# Patient Record
Sex: FEMALE | Race: WHITE | NOT HISPANIC OR LATINO | Employment: UNEMPLOYED | URBAN - NONMETROPOLITAN AREA
[De-identification: names, ages, dates, MRNs, and addresses within clinical notes are randomized per-mention and may not be internally consistent; named-entity substitution may affect disease eponyms.]

---

## 2023-08-23 ENCOUNTER — OFFICE VISIT (OUTPATIENT)
Dept: FAMILY MEDICINE CLINIC | Facility: CLINIC | Age: 58
End: 2023-08-23
Payer: MEDICAID

## 2023-08-23 VITALS
HEART RATE: 100 BPM | HEIGHT: 64 IN | OXYGEN SATURATION: 98 % | SYSTOLIC BLOOD PRESSURE: 120 MMHG | DIASTOLIC BLOOD PRESSURE: 80 MMHG | RESPIRATION RATE: 16 BRPM | BODY MASS INDEX: 20.52 KG/M2 | WEIGHT: 120.2 LBS

## 2023-08-23 DIAGNOSIS — E78.2 HYPERLIPIDEMIA, MIXED: ICD-10-CM

## 2023-08-23 DIAGNOSIS — I10 HYPERTENSION, ESSENTIAL: ICD-10-CM

## 2023-08-23 DIAGNOSIS — D64.9 ANEMIA, UNSPECIFIED TYPE: ICD-10-CM

## 2023-08-23 DIAGNOSIS — L98.9 SKIN LESION: ICD-10-CM

## 2023-08-23 DIAGNOSIS — E11.65 TYPE 2 DIABETES MELLITUS WITH HYPERGLYCEMIA, WITH LONG-TERM CURRENT USE OF INSULIN: Primary | ICD-10-CM

## 2023-08-23 DIAGNOSIS — Z79.4 TYPE 2 DIABETES MELLITUS WITH HYPERGLYCEMIA, WITH LONG-TERM CURRENT USE OF INSULIN: Primary | ICD-10-CM

## 2023-08-23 DIAGNOSIS — G62.9 NEUROPATHY: ICD-10-CM

## 2023-08-23 DIAGNOSIS — Z12.31 ENCOUNTER FOR SCREENING MAMMOGRAM FOR MALIGNANT NEOPLASM OF BREAST: ICD-10-CM

## 2023-08-23 PROCEDURE — 99214 OFFICE O/P EST MOD 30 MIN: CPT | Performed by: FAMILY MEDICINE

## 2023-08-23 PROCEDURE — 3079F DIAST BP 80-89 MM HG: CPT | Performed by: FAMILY MEDICINE

## 2023-08-23 PROCEDURE — 3074F SYST BP LT 130 MM HG: CPT | Performed by: FAMILY MEDICINE

## 2023-08-23 RX ORDER — OMEPRAZOLE 40 MG/1
40 CAPSULE, DELAYED RELEASE ORAL EVERY 12 HOURS SCHEDULED
Qty: 90 CAPSULE | Refills: 1 | Status: SHIPPED | OUTPATIENT
Start: 2023-08-23

## 2023-08-23 NOTE — ASSESSMENT & PLAN NOTE
Patient has not been getting any consistent care.  We are to check blood work to see where we are at.  Come back in 2 weeks for recheck.  If her blood sugars very high I may have her see Kaitlin.

## 2023-08-23 NOTE — PROGRESS NOTES
Patient Name: Jing Bojorquez  : 1965   MRN: [id number]     Chief Complaint:  BG and other medical problems  Chief Complaint   Patient presents with    Follow-up       History of Present Illness: Jing Bojorquez is a 57 y.o. female who is here today for follow up.  HPI        Review of Systems:   Review of Systems   Constitutional: Negative.    HENT: Negative.     Eyes: Negative.    Respiratory: Negative.     Cardiovascular: Negative.    Gastrointestinal: Negative.    Neurological: Negative.       Past Medical History: No past medical history on file.    Past Surgical History: No past surgical history on file.    Family History: No family history on file.    Social History:   Social History     Socioeconomic History    Marital status: Single   Tobacco Use    Smoking status: Never     Passive exposure: Current    Smokeless tobacco: Never   Substance and Sexual Activity    Alcohol use: Never    Drug use: Never    Sexual activity: Yes     Partners: Female       Medications:     Current Outpatient Medications:     amitriptyline (ELAVIL) 25 MG tablet, Take 1 tablet by mouth every night at bedtime., Disp: , Rfl:     atorvastatin (LIPITOR) 40 MG tablet, Take 1 tablet by mouth Daily., Disp: , Rfl:     B-D UF III MINI PEN NEEDLES 31G X 5 MM misc, USE 1 PENNEEDLE FOR INJECTION ONCE DAILY, Disp: , Rfl:     benazepril (LOTENSIN) 20 MG tablet, Take 1 tablet by mouth Daily., Disp: , Rfl:     Blood Glucose Monitoring Suppl (Blood Glucose Monitor System) w/Device kit, Check BG every morning before breakfast/eating  and as needed, Disp: 1 each, Rfl: 0    cetirizine (zyrTEC) 10 MG tablet, Take 1 tablet by mouth Daily., Disp: 30 tablet, Rfl: 11    glipizide (Glucotrol) 10 MG tablet, Take 1 tablet by mouth 2 (Two) Times a Day Before Meals., Disp: 60 tablet, Rfl: 11    hydrOXYzine pamoate (VISTARIL) 25 MG capsule, TAKE 2 CAPSULES BY MOUTH AT BEDTIME FOR INSOMNIA. TAKE 30 - 60 MINUTES BEFORE BED, Disp: , Rfl:     insulin  "glargine (LANTUS, SEMGLEE) 100 UNIT/ML injection, Inject 27.4 Units under the skin into the appropriate area as directed Daily., Disp: 8.22 mL, Rfl: 11    metFORMIN ER (GLUCOPHAGE-XR) 500 MG 24 hr tablet, Take 2 tablets by mouth Every 12 (Twelve) Hours., Disp: , Rfl:     omeprazole (priLOSEC) 40 MG capsule, Take 1 capsule by mouth Every 12 (Twelve) Hours., Disp: 90 capsule, Rfl: 1    Ventolin  (90 Base) MCG/ACT inhaler, , Disp: , Rfl:     Allergies:   Allergies   Allergen Reactions    Cefaclor Anaphylaxis    Penicillins Anaphylaxis    Azithromycin Hives    Morphine Headache    Metformin Other (See Comments)         Physical Exam:  Vital Signs:   Vitals:    08/23/23 1030   BP: 120/80   BP Location: Left arm   Patient Position: Sitting   Cuff Size: Adult   Pulse: 100   Resp: 16   SpO2: 98%   Weight: 54.5 kg (120 lb 3.2 oz)   Height: 162.6 cm (64.02\")     Body mass index is 20.62 kg/mý.     Physical Exam  Vitals and nursing note reviewed.   Constitutional:       Appearance: Normal appearance. She is normal weight.   HENT:      Head: Normocephalic and atraumatic.      Right Ear: Tympanic membrane, ear canal and external ear normal.      Left Ear: Tympanic membrane, ear canal and external ear normal.      Nose: Nose normal.      Mouth/Throat:      Mouth: Mucous membranes are dry.      Pharynx: Oropharynx is clear.   Eyes:      Extraocular Movements: Extraocular movements intact.      Conjunctiva/sclera: Conjunctivae normal.      Pupils: Pupils are equal, round, and reactive to light.   Cardiovascular:      Rate and Rhythm: Normal rate and regular rhythm.      Pulses: Normal pulses.      Heart sounds: Normal heart sounds.   Pulmonary:      Effort: Pulmonary effort is normal.      Breath sounds: Normal breath sounds.   Musculoskeletal:      Cervical back: Normal range of motion and neck supple.   Feet:      Comments:      Neurological:      Mental Status: She is alert.       Procedures      Assessment/Plan: "   Diagnoses and all orders for this visit:    1. Type 2 diabetes mellitus with hyperglycemia, with long-term current use of insulin (Primary)  Assessment & Plan:  Patient has not been getting any consistent care.  We are to check blood work to see where we are at.  Come back in 2 weeks for recheck.  If her blood sugars very high I may have her see Endo.    Orders:  -     Comprehensive Metabolic Panel; Future  -     Hemoglobin A1c; Future  -     Lipid Panel; Future  -     CBC & Differential; Future  -     Vitamin B12; Future  -     TSH Rfx On Abnormal To Free T4; Future  -     Ferritin; Future  -     Folate; Future  -     Iron Profile; Future    2. HTN  Assessment & Plan:  Discussed with patient to monitor their blood pressure and if systolic blood pressure goes above 140 or diastolic is above 90 to return to clinic.  Take medicines as directed, call for any problems, patient not having or any worrisome symptoms.        Orders:  -     Comprehensive Metabolic Panel; Future  -     Hemoglobin A1c; Future  -     Lipid Panel; Future  -     CBC & Differential; Future  -     Vitamin B12; Future  -     TSH Rfx On Abnormal To Free T4; Future  -     Ferritin; Future  -     Folate; Future  -     Iron Profile; Future    3. Hyperlipidemia, mixed  Assessment & Plan:  Blood work    Orders:  -     Comprehensive Metabolic Panel; Future  -     Hemoglobin A1c; Future  -     Lipid Panel; Future  -     CBC & Differential; Future  -     Vitamin B12; Future  -     TSH Rfx On Abnormal To Free T4; Future  -     Ferritin; Future  -     Folate; Future  -     Iron Profile; Future    4. Anemia, unspecified type  Assessment & Plan:  Going to check anemia studies.  She has had an upper and lower scope in the past 2 years.    Orders:  -     Comprehensive Metabolic Panel; Future  -     Hemoglobin A1c; Future  -     Lipid Panel; Future  -     CBC & Differential; Future  -     Vitamin B12; Future  -     TSH Rfx On Abnormal To Free T4; Future  -      Ferritin; Future  -     Folate; Future  -     Iron Profile; Future    5. Neuropathy  Assessment & Plan:  Check vitamin B12 and TSH.    Orders:  -     Comprehensive Metabolic Panel; Future  -     Hemoglobin A1c; Future  -     Lipid Panel; Future  -     CBC & Differential; Future  -     Vitamin B12; Future  -     TSH Rfx On Abnormal To Free T4; Future  -     Ferritin; Future  -     Folate; Future  -     Iron Profile; Future    6. Encounter for screening mammogram for malignant neoplasm of breast  Assessment & Plan:  Mammogram.    Orders:  -     Mammo Screening Bilateral With CAD; Future  -     Comprehensive Metabolic Panel; Future  -     Hemoglobin A1c; Future  -     Lipid Panel; Future  -     CBC & Differential; Future  -     Vitamin B12; Future  -     TSH Rfx On Abnormal To Free T4; Future  -     Ferritin; Future  -     Folate; Future  -     Iron Profile; Future    7. Skin lesion  Assessment & Plan:  She has a lesion in her right ear.  I Beatriz send her to dermatology.    Orders:  -     Comprehensive Metabolic Panel; Future  -     Hemoglobin A1c; Future  -     Lipid Panel; Future  -     CBC & Differential; Future  -     Vitamin B12; Future  -     TSH Rfx On Abnormal To Free T4; Future  -     Ferritin; Future  -     Folate; Future  -     Iron Profile; Future  -     Ambulatory Referral to Dermatology    Other orders  -     omeprazole (priLOSEC) 40 MG capsule; Take 1 capsule by mouth Every 12 (Twelve) Hours.  Dispense: 90 capsule; Refill: 1  -     Pneumococcal Conjugate Vaccine 20-Valent (PCV20)             Follow Up:   Return in about 2 weeks (around 9/6/2023) for Annual physical.    Antoni Lam MD  [facility]

## 2023-09-06 ENCOUNTER — LAB (OUTPATIENT)
Dept: FAMILY MEDICINE CLINIC | Facility: CLINIC | Age: 58
End: 2023-09-06
Payer: MEDICAID

## 2023-09-06 DIAGNOSIS — G62.9 NEUROPATHY: ICD-10-CM

## 2023-09-06 DIAGNOSIS — E78.2 HYPERLIPIDEMIA, MIXED: ICD-10-CM

## 2023-09-06 DIAGNOSIS — Z12.31 ENCOUNTER FOR SCREENING MAMMOGRAM FOR MALIGNANT NEOPLASM OF BREAST: ICD-10-CM

## 2023-09-06 DIAGNOSIS — L98.9 SKIN LESION: ICD-10-CM

## 2023-09-06 DIAGNOSIS — E11.65 TYPE 2 DIABETES MELLITUS WITH HYPERGLYCEMIA, WITH LONG-TERM CURRENT USE OF INSULIN: ICD-10-CM

## 2023-09-06 DIAGNOSIS — D64.9 ANEMIA, UNSPECIFIED TYPE: ICD-10-CM

## 2023-09-06 DIAGNOSIS — Z79.4 TYPE 2 DIABETES MELLITUS WITH HYPERGLYCEMIA, WITH LONG-TERM CURRENT USE OF INSULIN: ICD-10-CM

## 2023-09-06 DIAGNOSIS — I10 HYPERTENSION, ESSENTIAL: ICD-10-CM

## 2023-09-06 PROCEDURE — 36415 COLL VENOUS BLD VENIPUNCTURE: CPT | Performed by: FAMILY MEDICINE

## 2023-09-07 LAB
ALBUMIN SERPL-MCNC: 4.2 G/DL (ref 3.8–4.9)
ALBUMIN/GLOB SERPL: 1.4 {RATIO} (ref 1.2–2.2)
ALP SERPL-CCNC: 117 IU/L (ref 44–121)
ALT SERPL-CCNC: 284 IU/L (ref 0–32)
AST SERPL-CCNC: 221 IU/L (ref 0–40)
BASOPHILS # BLD AUTO: 0 X10E3/UL (ref 0–0.2)
BASOPHILS NFR BLD AUTO: 0 %
BILIRUB SERPL-MCNC: 0.4 MG/DL (ref 0–1.2)
BUN SERPL-MCNC: 17 MG/DL (ref 6–24)
BUN/CREAT SERPL: 30 (ref 9–23)
CALCIUM SERPL-MCNC: 9.3 MG/DL (ref 8.7–10.2)
CHLORIDE SERPL-SCNC: 99 MMOL/L (ref 96–106)
CHOLEST SERPL-MCNC: 94 MG/DL (ref 100–199)
CO2 SERPL-SCNC: 19 MMOL/L (ref 20–29)
CREAT SERPL-MCNC: 0.57 MG/DL (ref 0.57–1)
EGFRCR SERPLBLD CKD-EPI 2021: 107 ML/MIN/1.73
EOSINOPHIL # BLD AUTO: 0.1 X10E3/UL (ref 0–0.4)
EOSINOPHIL NFR BLD AUTO: 2 %
ERYTHROCYTE [DISTWIDTH] IN BLOOD BY AUTOMATED COUNT: 12.7 % (ref 11.7–15.4)
FOLATE SERPL-MCNC: >20 NG/ML
GLOBULIN SER CALC-MCNC: 2.9 G/DL (ref 1.5–4.5)
GLUCOSE SERPL-MCNC: 277 MG/DL (ref 70–99)
HBA1C MFR BLD: 10.7 % (ref 4.8–5.6)
HCT VFR BLD AUTO: 34.5 % (ref 34–46.6)
HDLC SERPL-MCNC: 46 MG/DL
HGB BLD-MCNC: 10.8 G/DL (ref 11.1–15.9)
IMM GRANULOCYTES # BLD AUTO: 0 X10E3/UL (ref 0–0.1)
IMM GRANULOCYTES NFR BLD AUTO: 0 %
LDLC SERPL CALC-MCNC: 22 MG/DL (ref 0–99)
LYMPHOCYTES # BLD AUTO: 1.8 X10E3/UL (ref 0.7–3.1)
LYMPHOCYTES NFR BLD AUTO: 26 %
MCH RBC QN AUTO: 26.5 PG (ref 26.6–33)
MCHC RBC AUTO-ENTMCNC: 31.3 G/DL (ref 31.5–35.7)
MCV RBC AUTO: 85 FL (ref 79–97)
MONOCYTES # BLD AUTO: 0.7 X10E3/UL (ref 0.1–0.9)
MONOCYTES NFR BLD AUTO: 11 %
NEUTROPHILS # BLD AUTO: 4 X10E3/UL (ref 1.4–7)
NEUTROPHILS NFR BLD AUTO: 61 %
PLATELET # BLD AUTO: 232 X10E3/UL (ref 150–450)
POTASSIUM SERPL-SCNC: 4.1 MMOL/L (ref 3.5–5.2)
PROT SERPL-MCNC: 7.1 G/DL (ref 6–8.5)
RBC # BLD AUTO: 4.08 X10E6/UL (ref 3.77–5.28)
SODIUM SERPL-SCNC: 137 MMOL/L (ref 134–144)
TRIGL SERPL-MCNC: 158 MG/DL (ref 0–149)
TSH SERPL DL<=0.005 MIU/L-ACNC: 1.46 UIU/ML (ref 0.45–4.5)
VIT B12 SERPL-MCNC: 1130 PG/ML (ref 232–1245)
VLDLC SERPL CALC-MCNC: 26 MG/DL (ref 5–40)
WBC # BLD AUTO: 6.7 X10E3/UL (ref 3.4–10.8)

## 2023-09-11 ENCOUNTER — OFFICE VISIT (OUTPATIENT)
Dept: FAMILY MEDICINE CLINIC | Facility: CLINIC | Age: 58
End: 2023-09-11
Payer: MEDICAID

## 2023-09-11 VITALS
HEIGHT: 64 IN | SYSTOLIC BLOOD PRESSURE: 128 MMHG | BODY MASS INDEX: 21.29 KG/M2 | HEART RATE: 77 BPM | OXYGEN SATURATION: 98 % | DIASTOLIC BLOOD PRESSURE: 84 MMHG | RESPIRATION RATE: 16 BRPM | WEIGHT: 124.7 LBS

## 2023-09-11 DIAGNOSIS — Z79.4 TYPE 2 DIABETES MELLITUS WITH HYPERGLYCEMIA, WITH LONG-TERM CURRENT USE OF INSULIN: Primary | ICD-10-CM

## 2023-09-11 DIAGNOSIS — Z59.819 HOUSING INSECURITY: ICD-10-CM

## 2023-09-11 DIAGNOSIS — E11.40 TYPE 2 DIABETES MELLITUS WITH DIABETIC NEUROPATHY, WITH LONG-TERM CURRENT USE OF INSULIN: ICD-10-CM

## 2023-09-11 DIAGNOSIS — E78.2 HYPERLIPIDEMIA, MIXED: ICD-10-CM

## 2023-09-11 DIAGNOSIS — I10 HYPERTENSION, ESSENTIAL: ICD-10-CM

## 2023-09-11 DIAGNOSIS — E11.65 TYPE 2 DIABETES MELLITUS WITH HYPERGLYCEMIA, WITH LONG-TERM CURRENT USE OF INSULIN: Primary | ICD-10-CM

## 2023-09-11 DIAGNOSIS — G62.9 NEUROPATHY: ICD-10-CM

## 2023-09-11 DIAGNOSIS — R79.89 ELEVATED LIVER FUNCTION TESTS: ICD-10-CM

## 2023-09-11 DIAGNOSIS — D64.9 ANEMIA, UNSPECIFIED TYPE: ICD-10-CM

## 2023-09-11 DIAGNOSIS — Z79.4 TYPE 2 DIABETES MELLITUS WITH DIABETIC NEUROPATHY, WITH LONG-TERM CURRENT USE OF INSULIN: ICD-10-CM

## 2023-09-11 PROBLEM — K57.30 DIVERTICULOSIS OF COLON: Status: ACTIVE | Noted: 2023-09-11

## 2023-09-11 PROBLEM — R63.4 ABNORMAL WEIGHT LOSS: Status: ACTIVE | Noted: 2023-09-11

## 2023-09-11 PROBLEM — M54.30 SCIATICA: Status: ACTIVE | Noted: 2023-06-26

## 2023-09-11 PROBLEM — Z90.49 HX OF CHOLECYSTECTOMY: Status: ACTIVE | Noted: 2018-06-19

## 2023-09-11 PROBLEM — R19.7 DIARRHEA: Status: ACTIVE | Noted: 2017-09-06

## 2023-09-11 PROBLEM — K52.9 ENTERITIS: Status: ACTIVE | Noted: 2020-09-30

## 2023-09-11 PROBLEM — N12 PYELONEPHRITIS: Status: ACTIVE | Noted: 2021-08-25

## 2023-09-11 PROBLEM — K59.00 CONSTIPATION: Status: ACTIVE | Noted: 2023-08-30

## 2023-09-11 PROBLEM — E11.10 METABOLIC ACIDOSIS DUE TO DIABETES MELLITUS: Status: ACTIVE | Noted: 2019-04-08

## 2023-09-11 PROBLEM — K21.9 ACID REFLUX: Status: ACTIVE | Noted: 2018-06-19

## 2023-09-11 PROBLEM — E87.1 HYPONATREMIA: Status: ACTIVE | Noted: 2019-04-08

## 2023-09-11 PROBLEM — E11.9 DIABETES: Status: ACTIVE | Noted: 2018-06-19

## 2023-09-11 PROBLEM — S20.219A CONTUSION OF RIB: Status: ACTIVE | Noted: 2018-04-08

## 2023-09-11 PROBLEM — M79.2 NEUROPATHIC PAIN OF LEFT LOWER EXTREMITY: Status: ACTIVE | Noted: 2023-06-26

## 2023-09-11 PROBLEM — S20.219A CHEST WALL CONTUSION: Status: ACTIVE | Noted: 2018-06-27

## 2023-09-11 PROBLEM — T78.40XA ALLERGIC REACTION CAUSED BY A DRUG: Status: ACTIVE | Noted: 2021-08-25

## 2023-09-11 PROBLEM — R07.9 CHEST PAIN: Status: ACTIVE | Noted: 2022-10-19

## 2023-09-11 PROBLEM — R73.9 HYPERGLYCEMIA: Status: ACTIVE | Noted: 2019-04-08

## 2023-09-11 PROBLEM — Z86.19 HISTORY OF HELICOBACTER PYLORI INFECTION: Status: ACTIVE | Noted: 2023-09-11

## 2023-09-11 PROBLEM — J02.0 STREP PHARYNGITIS: Status: ACTIVE | Noted: 2018-09-26

## 2023-09-11 PROBLEM — K22.70 SHORT-SEGMENT BARRETT'S ESOPHAGUS: Status: ACTIVE | Noted: 2023-09-11

## 2023-09-11 PROBLEM — R74.01 TRANSAMINITIS: Status: ACTIVE | Noted: 2019-04-08

## 2023-09-11 PROBLEM — R21 RASH: Status: ACTIVE | Noted: 2021-08-25

## 2023-09-11 PROBLEM — Z90.49 HX OF APPENDECTOMY: Status: ACTIVE | Noted: 2018-06-19

## 2023-09-11 PROBLEM — N17.9 ACUTE KIDNEY FAILURE: Status: ACTIVE | Noted: 2019-04-08

## 2023-09-11 PROCEDURE — 3079F DIAST BP 80-89 MM HG: CPT | Performed by: FAMILY MEDICINE

## 2023-09-11 PROCEDURE — 3074F SYST BP LT 130 MM HG: CPT | Performed by: FAMILY MEDICINE

## 2023-09-11 PROCEDURE — 3046F HEMOGLOBIN A1C LEVEL >9.0%: CPT | Performed by: FAMILY MEDICINE

## 2023-09-11 RX ORDER — INSULIN DETEMIR 100 [IU]/ML
INJECTION, SOLUTION SUBCUTANEOUS
COMMUNITY

## 2023-09-11 RX ORDER — INSULIN GLARGINE 100 [IU]/ML
0.5 INJECTION, SOLUTION SUBCUTANEOUS DAILY
Qty: 8.22 ML | Refills: 11 | Status: SHIPPED | OUTPATIENT
Start: 2023-09-11 | End: 2024-09-10

## 2023-09-11 SDOH — ECONOMIC STABILITY - HOUSING INSECURITY: HOUSING INSTABILITY UNSPECIFIED: Z59.819

## 2023-09-11 NOTE — PROGRESS NOTES
Patient Name: Jing Bojorquez  : 1965   MRN: [id number]     Chief Complaint:    Chief Complaint   Patient presents with    Follow-up       History of Present Illness: Jing Bojorquez is a 58 y.o. female who is here today for follow up.  HPI        Review of Systems:   Review of Systems     Past Medical History: No past medical history on file.    Past Surgical History: No past surgical history on file.    Family History: No family history on file.    Social History:   Social History     Socioeconomic History    Marital status: Single   Tobacco Use    Smoking status: Never     Passive exposure: Current    Smokeless tobacco: Never   Substance and Sexual Activity    Alcohol use: Never    Drug use: Never    Sexual activity: Yes     Partners: Female       Medications:     Current Outpatient Medications:     amitriptyline (ELAVIL) 25 MG tablet, Take 1 tablet by mouth every night at bedtime., Disp: , Rfl:     atorvastatin (LIPITOR) 40 MG tablet, Take 1 tablet by mouth Daily., Disp: , Rfl:     B-D UF III MINI PEN NEEDLES 31G X 5 MM misc, USE 1 PENNEEDLE FOR INJECTION ONCE DAILY, Disp: , Rfl:     benazepril (LOTENSIN) 20 MG tablet, Take 1 tablet by mouth Daily., Disp: , Rfl:     Blood Glucose Monitoring Suppl (Blood Glucose Monitor System) w/Device kit, Check BG every morning before breakfast/eating  and as needed, Disp: 1 each, Rfl: 0    cetirizine (zyrTEC) 10 MG tablet, Take 1 tablet by mouth Daily., Disp: 30 tablet, Rfl: 11    glipizide (Glucotrol) 10 MG tablet, Take 1 tablet by mouth 2 (Two) Times a Day Before Meals., Disp: 60 tablet, Rfl: 11    hydrOXYzine pamoate (VISTARIL) 25 MG capsule, TAKE 2 CAPSULES BY MOUTH AT BEDTIME FOR INSOMNIA. TAKE 30 - 60 MINUTES BEFORE BED, Disp: , Rfl:     insulin glargine (LANTUS, SEMGLEE) 100 UNIT/ML injection, Inject 27.4 Units under the skin into the appropriate area as directed Daily., Disp: 8.22 mL, Rfl: 11    metFORMIN ER (GLUCOPHAGE-XR) 500 MG 24 hr tablet, Take 2 tablets  "by mouth Every 12 (Twelve) Hours., Disp: , Rfl:     omeprazole (priLOSEC) 40 MG capsule, Take 1 capsule by mouth Every 12 (Twelve) Hours., Disp: 90 capsule, Rfl: 1    Ventolin  (90 Base) MCG/ACT inhaler, , Disp: , Rfl:     insulin detemir (Levemir FlexPen) 100 UNIT/ML injection, Inject  under the skin into the appropriate area as directed., Disp: , Rfl:     OMEPRAZOLE 2MG/ML LIQUID, Take  by mouth., Disp: , Rfl:     Allergies:   Allergies   Allergen Reactions    Cefaclor Anaphylaxis    Penicillins Anaphylaxis    Azithromycin Hives    Morphine Headache    Metformin Other (See Comments)         Physical Exam:  Vital Signs:   Vitals:    09/11/23 1040   BP: 128/84   BP Location: Left arm   Patient Position: Sitting   Cuff Size: Adult   Pulse: 77   Resp: 16   SpO2: 98%   Weight: 56.6 kg (124 lb 11.2 oz)   Height: 162.4 cm (63.94\")   PainSc: 10-Worst pain ever   PainLoc: Leg     Body mass index is 21.45 kg/m².     Physical Exam    Procedures      Assessment/Plan:   Diagnoses and all orders for this visit:    1. Type 2 diabetes mellitus with hyperglycemia, with long-term current use of insulin (Primary)  Assessment & Plan:  A1c is 10.7.  We will refer to endocrinology.      2. Hyperlipidemia, mixed  Assessment & Plan:  HDL 46.  LDL 22.  Triglycerides 158.  We will follow.      3. Elevated liver function tests  Assessment & Plan:  ALT is 84.  AST is 221.  Patient does not drink alcohol.  We will get a liver ultrasound and check hepatitis panel.    Orders:  -     Hepatitis C Antibody; Future  -     Hepatitis B Surface Antibody; Future  -     Hepatitis B Surface Antigen; Future  -     US Liver; Future    4. Hypertension, essential  Assessment & Plan:  Discussed with patient to monitor their blood pressure and if systolic blood pressure goes above 140 or diastolic is above 90 to return to clinic.  Take medicines as directed, call for any problems, patient not having or any worrisome symptoms.          5. Anemia, " unspecified type  Assessment & Plan:  Blood work.  Patient has seen GI.    Orders:  -     CBC & Differential; Future  -     Iron Profile; Future  -     Ferritin; Future  -     Haptoglobin; Future    6. Housing insecurity  Assessment & Plan:  Signed form for patient to get [employer] housing.      7. Neuropathy             Follow Up:   Return in about 1 month (around 10/11/2023) for Annual physical.    Antoni Lam MD  [facility]

## 2023-09-11 NOTE — ASSESSMENT & PLAN NOTE
ALT is 84.  AST is 221.  Patient does not drink alcohol.  We will get a liver ultrasound and check hepatitis panel.

## 2023-09-12 LAB
BASOPHILS # BLD AUTO: 0 X10E3/UL (ref 0–0.2)
BASOPHILS NFR BLD AUTO: 0 %
EOSINOPHIL # BLD AUTO: 0.1 X10E3/UL (ref 0–0.4)
EOSINOPHIL NFR BLD AUTO: 2 %
ERYTHROCYTE [DISTWIDTH] IN BLOOD BY AUTOMATED COUNT: 12.9 % (ref 11.7–15.4)
FERRITIN SERPL-MCNC: 51 NG/ML (ref 15–150)
HAPTOGLOB SERPL-MCNC: 149 MG/DL (ref 33–346)
HBV SURFACE AB SER QL: REACTIVE
HBV SURFACE AG SERPL QL IA: NEGATIVE
HCT VFR BLD AUTO: 30.7 % (ref 34–46.6)
HCV IGG SERPL QL IA: NON REACTIVE
HGB BLD-MCNC: 9.9 G/DL (ref 11.1–15.9)
IMM GRANULOCYTES # BLD AUTO: 0 X10E3/UL (ref 0–0.1)
IMM GRANULOCYTES NFR BLD AUTO: 0 %
IRON SATN MFR SERPL: 12 % (ref 15–55)
IRON SERPL-MCNC: 33 UG/DL (ref 27–159)
LYMPHOCYTES # BLD AUTO: 1.6 X10E3/UL (ref 0.7–3.1)
LYMPHOCYTES NFR BLD AUTO: 22 %
MCH RBC QN AUTO: 26.6 PG (ref 26.6–33)
MCHC RBC AUTO-ENTMCNC: 32.2 G/DL (ref 31.5–35.7)
MCV RBC AUTO: 83 FL (ref 79–97)
MONOCYTES # BLD AUTO: 0.7 X10E3/UL (ref 0.1–0.9)
MONOCYTES NFR BLD AUTO: 10 %
NEUTROPHILS # BLD AUTO: 4.9 X10E3/UL (ref 1.4–7)
NEUTROPHILS NFR BLD AUTO: 66 %
PLATELET # BLD AUTO: 216 X10E3/UL (ref 150–450)
RBC # BLD AUTO: 3.72 X10E6/UL (ref 3.77–5.28)
TIBC SERPL-MCNC: 274 UG/DL (ref 250–450)
UIBC SERPL-MCNC: 241 UG/DL (ref 131–425)
WBC # BLD AUTO: 7.3 X10E3/UL (ref 3.4–10.8)

## 2023-10-11 ENCOUNTER — TELEPHONE (OUTPATIENT)
Dept: FAMILY MEDICINE CLINIC | Facility: CLINIC | Age: 58
End: 2023-10-11

## 2023-10-11 DIAGNOSIS — G62.9 NEUROPATHY: Primary | ICD-10-CM

## 2023-10-11 DIAGNOSIS — E11.65 TYPE 2 DIABETES MELLITUS WITH HYPERGLYCEMIA, UNSPECIFIED WHETHER LONG TERM INSULIN USE: ICD-10-CM

## 2023-10-11 NOTE — TELEPHONE ENCOUNTER
Caller: Jing Bojorquez    Relationship: Self    Best call back number: [phone]     What are your current symptoms:   NEUROPATHY IN LEGS     Have you had these symptoms before:    [x] Yes  [] No    Have you been treated for these symptoms before:   [x] Yes  [] No    If a prescription is needed, what is your preferred pharmacy and phone number:    [facility] [address] [place] [address] [phone] [facility] [phone]  [phone]     Additional notes:  PATIENT STATED THAT SHE HAD A SCHEDULED APPOINTMENT TODAY 10/11/2023 AND GOT THE TIME MESSED UP FOR THE APPOINTMENT AND IS WAS COMING IN TO BE SEEN TO GET HELP WITH THE PAIN SHE IS HAVING IN HER LEGS FROM NEUROPATHY AND WOULD LIKE FOR MEDICATION TO BE CALLED INTO THE PHARMACY TO HELP SINCE SHE WAS UNABLE TO BE SEEN IN THE OFFICE TODAY 10/11/2023

## 2023-10-12 ENCOUNTER — TELEPHONE (OUTPATIENT)
Dept: FAMILY MEDICINE CLINIC | Facility: CLINIC | Age: 58
End: 2023-10-12
Payer: MEDICAID

## 2023-10-12 NOTE — TELEPHONE ENCOUNTER
Sent in referral for endocrinology with her elevated liver function tests I do not want to give her anything for pain at this time.

## 2023-10-12 NOTE — TELEPHONE ENCOUNTER
Patient called back in. I spoke to about how she was feeling today. She is complaining of her legs still being swollen and painful. I told her Dr. Lam would put a referral in for pain management and discussed her being terminated from seeing our office. She understood, and requested that he also check on her endocrinologist referral. I looked into that for her and it looks like it was closed where she did not answer or call back to schedule. Patient was understanding of everything, I told her to make sure she tries to answer phone numbers from [employer] or at least call those back because it could be someone about her referrals.

## 2023-10-12 NOTE — TELEPHONE ENCOUNTER
PATIENT CALLED BACK. RELAYED INFORMATION TO PATIENT. CALLED OFFICE TO [facility]. NO ANSWER TWICE.  PLEASE CALL PATIENT BACK AND SCHEDULE AN APPOINTMENT FOR HER.  THANK YOU.

## 2023-10-12 NOTE — TELEPHONE ENCOUNTER
I have a call into the patient. Please do a referral to pain management. Once I speak to patient I will see if she needs any of her medications refilled.

## 2023-10-12 NOTE — TELEPHONE ENCOUNTER
Can you create a referral for endocrinology?    Also wanting something for leg pain if possible.    Said she was good on all her other medications.

## 2023-10-25 ENCOUNTER — TELEPHONE (OUTPATIENT)
Dept: FAMILY MEDICINE CLINIC | Facility: CLINIC | Age: 58
End: 2023-10-25
Payer: MEDICAID

## 2023-10-25 DIAGNOSIS — K21.9 GASTROESOPHAGEAL REFLUX DISEASE WITHOUT ESOPHAGITIS: ICD-10-CM

## 2023-10-25 DIAGNOSIS — E11.65 TYPE 2 DIABETES MELLITUS WITH HYPERGLYCEMIA, WITH LONG-TERM CURRENT USE OF INSULIN: Primary | ICD-10-CM

## 2023-10-25 DIAGNOSIS — Z79.4 TYPE 2 DIABETES MELLITUS WITH HYPERGLYCEMIA, WITH LONG-TERM CURRENT USE OF INSULIN: Primary | ICD-10-CM

## 2023-10-25 RX ORDER — OMEPRAZOLE 40 MG/1
40 CAPSULE, DELAYED RELEASE ORAL EVERY 12 HOURS SCHEDULED
Qty: 90 CAPSULE | Refills: 1 | Status: SHIPPED | OUTPATIENT
Start: 2023-10-25

## 2023-10-25 RX ORDER — METFORMIN HYDROCHLORIDE 500 MG/1
1000 TABLET, EXTENDED RELEASE ORAL EVERY 12 HOURS SCHEDULED
Qty: 180 TABLET | Refills: 0 | Status: SHIPPED | OUTPATIENT
Start: 2023-10-25

## 2023-10-25 NOTE — TELEPHONE ENCOUNTER
Caller: Jing Bojorquez    Relationship: Self    Best call back number:       [phone] (Mobile)     Requested Prescriptions:       metFORMIN ER (GLUCOPHAGE-XR) 500 MG 24 hr tablet       omeprazole (priLOSEC) 40 MG capsule     Pharm[facility] where request should be sent:     [place] [address] [phone] [phone]      Last office visit with prescribing clinician: 9/11/2023   Last telemedicine visit with prescribing clinician: Visit date not found   Next office visit with prescribing clinician: Visit date not found     Additional details provided by patient:     PATIENT STATED SHE IS OUT OF THE METFORMIN AND WILL BE OUT OF THE OMEPRAZOLE IN (2) DAYS    PATIENT ALSO STATED SHE IS OUT OF REFILLS FOR THE OMEPRAZOLE    Does the patient have less than a 3 day supply:  [x] Yes  [] No    Would you like a call back once the refill request has been completed: [] Yes [] No    If the office needs to give you a call back, can they leave a voicemail: [] Yes [] No    Radha Whaley Rep   10/25/23 11:52 EDT     DR DAY

## 2023-12-08 DIAGNOSIS — Z79.4 TYPE 2 DIABETES MELLITUS WITH HYPERGLYCEMIA, WITH LONG-TERM CURRENT USE OF INSULIN: ICD-10-CM

## 2023-12-08 DIAGNOSIS — E11.65 TYPE 2 DIABETES MELLITUS WITH HYPERGLYCEMIA, WITH LONG-TERM CURRENT USE OF INSULIN: ICD-10-CM

## 2023-12-08 NOTE — TELEPHONE ENCOUNTER
Caller: Jing Bojorquez    Relationship: Self    Best call back number: [phone]     Requested Prescriptions:   Requested Prescriptions     Pending Prescriptions Disp Refills    atorvastatin (LIPITOR) 40 MG tablet 90 tablet      Sig: Take 1 tablet by mouth Daily.    metFORMIN ER (GLUCOPHAGE-XR) 500 MG 24 hr tablet 180 tablet 0     Sig: Take 2 tablets by mouth Every 12 (Twelve) Hours.    benazepril (LOTENSIN) 20 MG tablet       Sig: Take 1 tablet by mouth Daily.        Pharmacy where request should be sent: [address] [phone] [facility] [phone] [facility]     Last office visit with prescribing clinician: 9/11/2023   Last telemedicine visit with prescribing clinician: Visit date not found   Next office visit with prescribing clinician: Visit date not found     Additional details provided by patient: PATIENT SAID SHE IS VERY LOW OR COMPLETELY OUT OF THESE MEDICATIONS        Does the patient have less than a 3 day supply:  [x] Yes  [] No    Would you like a call back once the refill request has been completed: [] Yes [x] No    If the office needs to give you a call back, can they leave a voicemail: [] Yes [x] No    Radha Shelley Rep   12/08/23 10:15 EST

## 2023-12-08 NOTE — TELEPHONE ENCOUNTER
Rx Refill Note    Requested Prescriptions     Pending Prescriptions Disp Refills    atorvastatin (LIPITOR) 40 MG tablet 90 tablet 0     Sig: Take 1 tablet by mouth Daily.    metFORMIN ER (GLUCOPHAGE-XR) 500 MG 24 hr tablet 180 tablet 0     Sig: Take 2 tablets by mouth Every 12 (Twelve) Hours.    benazepril (LOTENSIN) 20 MG tablet 90 tablet 0     Sig: Take 1 tablet by mouth Daily.        Last office visit with prescribing clinician: 9/11/2023      Next office visit with prescribing clinician: Visit date not found   Last labs:   Last refill: needs   Pharmacy (be sure to add in Epic). Correct

## 2023-12-10 RX ORDER — BENAZEPRIL HYDROCHLORIDE 20 MG/1
20 TABLET ORAL DAILY
Qty: 90 TABLET | Refills: 0 | Status: SHIPPED | OUTPATIENT
Start: 2023-12-10

## 2023-12-10 RX ORDER — ATORVASTATIN CALCIUM 40 MG/1
40 TABLET, FILM COATED ORAL DAILY
Qty: 90 TABLET | Refills: 0 | Status: SHIPPED | OUTPATIENT
Start: 2023-12-10

## 2023-12-10 RX ORDER — METFORMIN HYDROCHLORIDE 500 MG/1
1000 TABLET, EXTENDED RELEASE ORAL EVERY 12 HOURS SCHEDULED
Qty: 180 TABLET | Refills: 0 | Status: SHIPPED | OUTPATIENT
Start: 2023-12-10

## 2023-12-20 ENCOUNTER — OFFICE VISIT (OUTPATIENT)
Dept: FAMILY MEDICINE CLINIC | Facility: CLINIC | Age: 58
End: 2023-12-20
Payer: MEDICAID

## 2023-12-20 VITALS
BODY MASS INDEX: 22.53 KG/M2 | DIASTOLIC BLOOD PRESSURE: 70 MMHG | OXYGEN SATURATION: 98 % | HEART RATE: 95 BPM | WEIGHT: 131 LBS | SYSTOLIC BLOOD PRESSURE: 110 MMHG

## 2023-12-20 DIAGNOSIS — G62.9 NEUROPATHY: ICD-10-CM

## 2023-12-20 DIAGNOSIS — K21.9 GERD WITHOUT ESOPHAGITIS: ICD-10-CM

## 2023-12-20 DIAGNOSIS — I10 HYPERTENSION, ESSENTIAL: ICD-10-CM

## 2023-12-20 DIAGNOSIS — R79.89 ELEVATED LIVER FUNCTION TESTS: ICD-10-CM

## 2023-12-20 DIAGNOSIS — E11.42 TYPE 2 DIABETES MELLITUS WITH DIABETIC POLYNEUROPATHY, WITHOUT LONG-TERM CURRENT USE OF INSULIN: ICD-10-CM

## 2023-12-20 DIAGNOSIS — J30.1 SEASONAL ALLERGIC RHINITIS DUE TO POLLEN: ICD-10-CM

## 2023-12-20 DIAGNOSIS — E78.2 HYPERLIPIDEMIA, MIXED: ICD-10-CM

## 2023-12-20 DIAGNOSIS — E11.65 TYPE 2 DIABETES MELLITUS WITH HYPERGLYCEMIA, WITHOUT LONG-TERM CURRENT USE OF INSULIN: Primary | ICD-10-CM

## 2023-12-20 DIAGNOSIS — Z23 NEED FOR PROPHYLACTIC VACCINATION AGAINST STREPTOCOCCUS PNEUMONIAE (PNEUMOCOCCUS): ICD-10-CM

## 2023-12-20 DIAGNOSIS — R80.9 TYPE 2 DIABETES MELLITUS WITH MICROALBUMINURIA, WITHOUT LONG-TERM CURRENT USE OF INSULIN: ICD-10-CM

## 2023-12-20 DIAGNOSIS — E11.29 TYPE 2 DIABETES MELLITUS WITH MICROALBUMINURIA, WITHOUT LONG-TERM CURRENT USE OF INSULIN: ICD-10-CM

## 2023-12-20 PROBLEM — R63.4 ABNORMAL WEIGHT LOSS: Status: RESOLVED | Noted: 2023-09-11 | Resolved: 2023-12-20

## 2023-12-20 PROBLEM — S20.219A CONTUSION OF RIB: Status: RESOLVED | Noted: 2018-04-08 | Resolved: 2023-12-20

## 2023-12-20 PROBLEM — N17.9 ACUTE KIDNEY FAILURE: Status: RESOLVED | Noted: 2019-04-08 | Resolved: 2023-12-20

## 2023-12-20 PROBLEM — N12 PYELONEPHRITIS: Status: RESOLVED | Noted: 2021-08-25 | Resolved: 2023-12-20

## 2023-12-20 PROBLEM — E11.40 TYPE 2 DIABETES MELLITUS WITH DIABETIC NEUROPATHY, WITH LONG-TERM CURRENT USE OF INSULIN: Status: ACTIVE | Noted: 2023-12-20

## 2023-12-20 PROBLEM — T78.40XA ALLERGIC REACTION CAUSED BY A DRUG: Status: RESOLVED | Noted: 2021-08-25 | Resolved: 2023-12-20

## 2023-12-20 PROBLEM — K59.00 CONSTIPATION: Status: RESOLVED | Noted: 2023-08-30 | Resolved: 2023-12-20

## 2023-12-20 PROBLEM — D64.9 ANEMIA: Status: RESOLVED | Noted: 2023-08-23 | Resolved: 2023-12-20

## 2023-12-20 PROBLEM — E87.1 HYPONATREMIA: Status: RESOLVED | Noted: 2019-04-08 | Resolved: 2023-12-20

## 2023-12-20 PROBLEM — Z12.31 ENCOUNTER FOR SCREENING MAMMOGRAM FOR MALIGNANT NEOPLASM OF BREAST: Status: RESOLVED | Noted: 2023-08-23 | Resolved: 2023-12-20

## 2023-12-20 PROBLEM — Z79.4 TYPE 2 DIABETES MELLITUS WITH HYPERGLYCEMIA, WITH LONG-TERM CURRENT USE OF INSULIN: Chronic | Status: ACTIVE | Noted: 2023-08-23

## 2023-12-20 PROBLEM — E11.10 METABOLIC ACIDOSIS DUE TO DIABETES MELLITUS: Status: RESOLVED | Noted: 2019-04-08 | Resolved: 2023-12-20

## 2023-12-20 PROBLEM — Z90.49 HX OF CHOLECYSTECTOMY: Status: RESOLVED | Noted: 2018-06-19 | Resolved: 2023-12-20

## 2023-12-20 PROBLEM — R19.7 DIARRHEA: Status: RESOLVED | Noted: 2017-09-06 | Resolved: 2023-12-20

## 2023-12-20 PROBLEM — R21 RASH: Status: RESOLVED | Noted: 2021-08-25 | Resolved: 2023-12-20

## 2023-12-20 PROBLEM — R74.01 TRANSAMINITIS: Status: RESOLVED | Noted: 2019-04-08 | Resolved: 2023-12-20

## 2023-12-20 PROBLEM — R73.9 HYPERGLYCEMIA: Status: RESOLVED | Noted: 2019-04-08 | Resolved: 2023-12-20

## 2023-12-20 PROBLEM — Z79.4 TYPE 2 DIABETES MELLITUS WITH DIABETIC NEUROPATHY, WITH LONG-TERM CURRENT USE OF INSULIN: Chronic | Status: ACTIVE | Noted: 2023-12-20

## 2023-12-20 PROBLEM — Z86.19 HISTORY OF HELICOBACTER PYLORI INFECTION: Status: RESOLVED | Noted: 2023-09-11 | Resolved: 2023-12-20

## 2023-12-20 PROBLEM — K52.9 ENTERITIS: Status: RESOLVED | Noted: 2020-09-30 | Resolved: 2023-12-20

## 2023-12-20 PROBLEM — M54.30 SCIATICA: Status: RESOLVED | Noted: 2023-06-26 | Resolved: 2023-12-20

## 2023-12-20 PROBLEM — J02.0 STREP PHARYNGITIS: Status: RESOLVED | Noted: 2018-09-26 | Resolved: 2023-12-20

## 2023-12-20 PROBLEM — L98.9 SKIN LESION: Status: RESOLVED | Noted: 2023-08-23 | Resolved: 2023-12-20

## 2023-12-20 PROBLEM — Z90.49 HX OF APPENDECTOMY: Status: RESOLVED | Noted: 2018-06-19 | Resolved: 2023-12-20

## 2023-12-20 PROBLEM — Z79.4 TYPE 2 DIABETES MELLITUS WITH DIABETIC NEUROPATHY, WITH LONG-TERM CURRENT USE OF INSULIN: Status: ACTIVE | Noted: 2023-12-20

## 2023-12-20 PROBLEM — Z59.819 HOUSING INSECURITY: Status: RESOLVED | Noted: 2023-09-11 | Resolved: 2023-12-20

## 2023-12-20 PROBLEM — K57.30 DIVERTICULOSIS OF COLON: Status: RESOLVED | Noted: 2023-09-11 | Resolved: 2023-12-20

## 2023-12-20 PROBLEM — R07.9 CHEST PAIN: Status: RESOLVED | Noted: 2022-10-19 | Resolved: 2023-12-20

## 2023-12-20 PROBLEM — E11.40 TYPE 2 DIABETES MELLITUS WITH DIABETIC NEUROPATHY, WITH LONG-TERM CURRENT USE OF INSULIN: Chronic | Status: ACTIVE | Noted: 2023-12-20

## 2023-12-20 PROBLEM — M79.2 NEUROPATHIC PAIN OF LEFT LOWER EXTREMITY: Status: RESOLVED | Noted: 2023-06-26 | Resolved: 2023-12-20

## 2023-12-20 PROBLEM — E11.9 DIABETES: Status: RESOLVED | Noted: 2018-06-19 | Resolved: 2023-12-20

## 2023-12-20 PROBLEM — K22.70 SHORT-SEGMENT BARRETT'S ESOPHAGUS: Status: RESOLVED | Noted: 2023-09-11 | Resolved: 2023-12-20

## 2023-12-20 PROBLEM — S20.219A CHEST WALL CONTUSION: Status: RESOLVED | Noted: 2018-06-27 | Resolved: 2023-12-20

## 2023-12-20 LAB
EXPIRATION DATE: NORMAL
Lab: NORMAL
POC CREATININE URINE: 200
POC MICROALBUMIN URINE: 80

## 2023-12-20 RX ORDER — OMEPRAZOLE 40 MG/1
40 CAPSULE, DELAYED RELEASE ORAL 2 TIMES DAILY
Qty: 180 CAPSULE | Refills: 1 | Status: SHIPPED | OUTPATIENT
Start: 2023-12-20

## 2023-12-20 RX ORDER — AMITRIPTYLINE HYDROCHLORIDE 25 MG/1
25 TABLET, FILM COATED ORAL
Qty: 90 TABLET | Refills: 1 | Status: SHIPPED | OUTPATIENT
Start: 2023-12-20

## 2023-12-20 RX ORDER — ASPIRIN 81 MG/1
81 TABLET ORAL DAILY
Qty: 90 TABLET | Refills: 1 | Status: SHIPPED | OUTPATIENT
Start: 2023-12-20

## 2023-12-20 RX ORDER — ATORVASTATIN CALCIUM 40 MG/1
40 TABLET, FILM COATED ORAL DAILY
Qty: 90 TABLET | Refills: 1 | Status: SHIPPED | OUTPATIENT
Start: 2023-12-20

## 2023-12-20 RX ORDER — BENAZEPRIL HYDROCHLORIDE 20 MG/1
20 TABLET ORAL DAILY
Qty: 90 TABLET | Refills: 1 | Status: SHIPPED | OUTPATIENT
Start: 2023-12-20

## 2023-12-20 RX ORDER — METFORMIN HYDROCHLORIDE 500 MG/1
1000 TABLET, EXTENDED RELEASE ORAL
Qty: 360 TABLET | Refills: 1 | Status: SHIPPED | OUTPATIENT
Start: 2023-12-20

## 2023-12-20 RX ORDER — CETIRIZINE HYDROCHLORIDE 10 MG/1
10 TABLET ORAL DAILY
Qty: 90 TABLET | Refills: 1 | Status: SHIPPED | OUTPATIENT
Start: 2023-12-20

## 2023-12-20 RX ORDER — GLIPIZIDE 10 MG/1
20 TABLET ORAL
Qty: 360 TABLET | Refills: 1 | Status: SHIPPED | OUTPATIENT
Start: 2023-12-20

## 2023-12-20 NOTE — ASSESSMENT & PLAN NOTE
Diabetes is unchanged.   Dietary recommendations for ADA diet.  Regular aerobic exercise.  Discussed foot care.  Medication changes per orders.  Diabetes will be reassessed  4 mos  Declines endo referral  Declines injections of any kind    Cont metformin, asa, atorvastatin, benazepril.  Adjusted up glipizide.  Discussed risks with hypoglycemia and need to always take with food    Recheck in 4 mos or prn    Podiatry followup and ophthalmology followup ongoing.

## 2023-12-20 NOTE — PROGRESS NOTES
Chief Complaint  Establish Care    Subjective    History of Present Illness:  Jing Bojorquez is a 58 y.o. female who presents today for new pt visit to [facility].    She was seeing Dr. Lam at our [place] office but was discharged due to noncompliance/frequent no-shows    Last labs with elevated LFTs.  Liver ultrasound done Sept 2023 returned normal.     She has not been taking any insulin.  Declines injection options including once-weekly options.    Willing for adjustment with glipizide given A1c above goal    Declines endo consult and hepatology consult at this time    No alcohol or other drugs.  No smoking    Declines flu     Willing for prevnar 20 today    Urine microalb pos 12/20/23  Eye exam uptodate 6/2023  Foot exam uptodate 12/2023 with podiatry - they are restarting gabapentin.  She is also on elavil for neuropathy    [place] [facility] [place]  Considering GYN/pelvic next yr        Objective   Vital Signs:   /70   Pulse 95   Wt 59.4 kg (131 lb)   SpO2 98%   BMI 22.53 kg/m²     Review of Systems   Constitutional:  Negative for appetite change, chills and fever.   HENT:  Negative for hearing loss.    Eyes:  Negative for blurred vision.   Respiratory:  Negative for chest tightness.    Cardiovascular:  Negative for chest pain.   Gastrointestinal:  Negative for abdominal pain.   Musculoskeletal:  Negative for gait problem.   Skin:  Negative for rash.   Neurological:  Positive for numbness.   Psychiatric/Behavioral:  Negative for depressed mood.        Past History:  Medical History: has no past medical history on file.   Surgical History: has no past surgical history on file.   Family History: family history is not on file.   Social History: reports that she has never smoked. She has been exposed to tobacco smoke. She has never used smokeless tobacco. She reports that she does not drink alcohol and does not use drugs.      Current Outpatient Medications:     amitriptyline (ELAVIL) 25 MG tablet, Take 1  tablet by mouth every night at bedtime., Disp: 90 tablet, Rfl: 1    atorvastatin (LIPITOR) 40 MG tablet, Take 1 tablet by mouth Daily. For heart protection and stroke prevention, Disp: 90 tablet, Rfl: 1    benazepril (LOTENSIN) 20 MG tablet, Take 1 tablet by mouth Daily., Disp: 90 tablet, Rfl: 1    Blood Glucose Monitoring Suppl (Blood Glucose Monitor System) w/Device kit, Check BG every morning before breakfast/eating  and as needed, Disp: 1 each, Rfl: 0    cetirizine (zyrTEC) 10 MG tablet, Take 1 tablet by mouth Daily., Disp: 90 tablet, Rfl: 1    glipizide (Glucotrol) 10 MG tablet, Take 2 tablets by mouth 2 (Two) Times a Day Before Meals., Disp: 360 tablet, Rfl: 1    metFORMIN ER (GLUCOPHAGE-XR) 500 MG 24 hr tablet, Take 2 tablets by mouth Daily With Breakfast & Dinner., Disp: 360 tablet, Rfl: 1    omeprazole (priLOSEC) 40 MG capsule, Take 1 capsule by mouth 2 (Two) Times a Day., Disp: 180 capsule, Rfl: 1    aspirin 81 MG EC tablet, Take 1 tablet by mouth Daily., Disp: 90 tablet, Rfl: 1    Allergies: Cefaclor, Penicillins, Azithromycin, Morphine, and Metformin    Physical Exam  Constitutional:       Appearance: Normal appearance.   HENT:      Head: Normocephalic.      Right Ear: External ear normal.      Left Ear: External ear normal.      Nose: Nose normal.   Eyes:      Pupils: Pupils are equal, round, and reactive to light.   Cardiovascular:      Rate and Rhythm: Normal rate and regular rhythm.      Heart sounds: Normal heart sounds.   Pulmonary:      Effort: Pulmonary effort is normal.      Breath sounds: Normal breath sounds.   Musculoskeletal:         General: Normal range of motion.      Cervical back: Normal range of motion.   Skin:     General: Skin is warm and dry.   Neurological:      General: No focal deficit present.      Mental Status: She is alert.   Psychiatric:         Mood and Affect: Mood normal.         Behavior: Behavior normal.         Thought Content: Thought content normal.          Result  Review                   Assessment and Plan  Diagnoses and all orders for this visit:    1. Type 2 diabetes mellitus with hyperglycemia, without long-term current use of insulin (Primary)  Assessment & Plan:  Diabetes is unchanged.   Dietary recommendations for ADA diet.  Regular aerobic exercise.  Discussed foot care.  Medication changes per orders.  Diabetes will be reassessed  4 mos  Declines endo referral  Declines injections of any kind    Cont metformin, asa, atorvastatin, benazepril.  Adjusted up glipizide.  Discussed risks with hypoglycemia and need to always take with food    Recheck in 4 mos or prn    Podiatry followup and ophthalmology followup ongoing.    Orders:  -     aspirin 81 MG EC tablet; Take 1 tablet by mouth Daily.  Dispense: 90 tablet; Refill: 1  -     metFORMIN ER (GLUCOPHAGE-XR) 500 MG 24 hr tablet; Take 2 tablets by mouth Daily With Breakfast & Dinner.  Dispense: 360 tablet; Refill: 1  -     glipizide (Glucotrol) 10 MG tablet; Take 2 tablets by mouth 2 (Two) Times a Day Before Meals.  Dispense: 360 tablet; Refill: 1  -     atorvastatin (LIPITOR) 40 MG tablet; Take 1 tablet by mouth Daily. For heart protection and stroke prevention  Dispense: 90 tablet; Refill: 1  -     POCT microalbumin    2. Type 2 diabetes mellitus with microalbuminuria, without long-term current use of insulin  Assessment & Plan:  Diabetes is unchanged.   Medication changes per orders.  Diabetes will be reassessed  4 mos .    Orders:  -     aspirin 81 MG EC tablet; Take 1 tablet by mouth Daily.  Dispense: 90 tablet; Refill: 1  -     metFORMIN ER (GLUCOPHAGE-XR) 500 MG 24 hr tablet; Take 2 tablets by mouth Daily With Breakfast & Dinner.  Dispense: 360 tablet; Refill: 1  -     glipizide (Glucotrol) 10 MG tablet; Take 2 tablets by mouth 2 (Two) Times a Day Before Meals.  Dispense: 360 tablet; Refill: 1  -     atorvastatin (LIPITOR) 40 MG tablet; Take 1 tablet by mouth Daily. For heart protection and stroke prevention   Dispense: 90 tablet; Refill: 1  -     benazepril (LOTENSIN) 20 MG tablet; Take 1 tablet by mouth Daily.  Dispense: 90 tablet; Refill: 1  -     POCT microalbumin    3. Type 2 diabetes mellitus with diabetic polyneuropathy, without long-term current use of insulin  Assessment & Plan:  Diabetes is unchanged.   Medication changes per orders.  Diabetes will be reassessed  4 mos .    Orders:  -     amitriptyline (ELAVIL) 25 MG tablet; Take 1 tablet by mouth every night at bedtime.  Dispense: 90 tablet; Refill: 1  -     metFORMIN ER (GLUCOPHAGE-XR) 500 MG 24 hr tablet; Take 2 tablets by mouth Daily With Breakfast & Dinner.  Dispense: 360 tablet; Refill: 1  -     glipizide (Glucotrol) 10 MG tablet; Take 2 tablets by mouth 2 (Two) Times a Day Before Meals.  Dispense: 360 tablet; Refill: 1  -     atorvastatin (LIPITOR) 40 MG tablet; Take 1 tablet by mouth Daily. For heart protection and stroke prevention  Dispense: 90 tablet; Refill: 1  -     POCT microalbumin    4. Hyperlipidemia, mixed  Assessment & Plan:  Lipid abnormalities are improving with treatment.  Pharmacotherapy as ordered.  Lipids will be reassessed  4 mos .    Orders:  -     atorvastatin (LIPITOR) 40 MG tablet; Take 1 tablet by mouth Daily. For heart protection and stroke prevention  Dispense: 90 tablet; Refill: 1    5. HTN  Assessment & Plan:  Hypertension is improving with treatment.  Continue current treatment regimen.  Blood pressure will be reassessed at the next regular appointment.    Orders:  -     benazepril (LOTENSIN) 20 MG tablet; Take 1 tablet by mouth Daily.  Dispense: 90 tablet; Refill: 1    6. Neuropathy  Assessment & Plan:  Cont elavil    Following now with podiatry.  Restarting neurontin with podiatry     Orders:  -     amitriptyline (ELAVIL) 25 MG tablet; Take 1 tablet by mouth every night at bedtime.  Dispense: 90 tablet; Refill: 1    7. Elevated liver function tests  Assessment & Plan:  Reviewed normal liver u/s    Declines hepatology  referral    Neg testing for hep B and C.  Pos antibody indicating response to hep B vaccines    Declines labs today - will recheck at followup       8. Seasonal allergic rhinitis due to pollen  Assessment & Plan:  Cont zyrtec    Orders:  -     cetirizine (zyrTEC) 10 MG tablet; Take 1 tablet by mouth Daily.  Dispense: 90 tablet; Refill: 1    9. GERD without esophagitis  Assessment & Plan:  Well controlled with omeprazole    Orders:  -     omeprazole (priLOSEC) 40 MG capsule; Take 1 capsule by mouth 2 (Two) Times a Day.  Dispense: 180 capsule; Refill: 1    10. Need for prophylactic vaccination against Streptococcus pneumoniae (pneumococcus)  -     Pneumococcal Conjugate Vaccine 20-Valent (PCV20)        BMI is within normal parameters. No other follow-up for BMI required.          Follow Up  Return in about 4 months (around 4/20/2024) for Med recheck, Fasting for labs at appointment (but drink water!).    Norbert Cardenas MD

## 2023-12-20 NOTE — ASSESSMENT & PLAN NOTE
Reviewed normal liver u/s    Declines hepatology referral    Neg testing for hep B and C.  Pos antibody indicating response to hep B vaccines    Declines labs today - will recheck at followup

## 2023-12-20 NOTE — ASSESSMENT & PLAN NOTE
Lipid abnormalities are improving with treatment.  Pharmacotherapy as ordered.  Lipids will be reassessed  4 mos .

## 2024-01-17 DIAGNOSIS — R80.9 TYPE 2 DIABETES MELLITUS WITH MICROALBUMINURIA, WITHOUT LONG-TERM CURRENT USE OF INSULIN: ICD-10-CM

## 2024-01-17 DIAGNOSIS — E11.29 TYPE 2 DIABETES MELLITUS WITH MICROALBUMINURIA, WITHOUT LONG-TERM CURRENT USE OF INSULIN: ICD-10-CM

## 2024-01-17 DIAGNOSIS — G62.9 NEUROPATHY: ICD-10-CM

## 2024-01-17 DIAGNOSIS — E11.65 TYPE 2 DIABETES MELLITUS WITH HYPERGLYCEMIA, WITHOUT LONG-TERM CURRENT USE OF INSULIN: ICD-10-CM

## 2024-01-17 DIAGNOSIS — E11.42 TYPE 2 DIABETES MELLITUS WITH DIABETIC POLYNEUROPATHY, WITHOUT LONG-TERM CURRENT USE OF INSULIN: ICD-10-CM

## 2024-01-17 RX ORDER — GLIPIZIDE 10 MG/1
20 TABLET ORAL
Qty: 360 TABLET | Refills: 1 | Status: SHIPPED | OUTPATIENT
Start: 2024-01-17

## 2024-01-17 RX ORDER — AMITRIPTYLINE HYDROCHLORIDE 25 MG/1
25 TABLET, FILM COATED ORAL
Qty: 90 TABLET | Refills: 1 | Status: SHIPPED | OUTPATIENT
Start: 2024-01-17

## 2024-01-17 NOTE — TELEPHONE ENCOUNTER
Caller: Jing Bojorquez    Relationship: Self    Best call back number: [phone]     Requested Prescriptions:   Requested Prescriptions     Pending Prescriptions Disp Refills    amitriptyline (ELAVIL) 25 MG tablet 90 tablet 1     Sig: Take 1 tablet by mouth every night at bedtime.    glipizide (Glucotrol) 10 MG tablet 360 tablet 1     Sig: Take 2 tablets by mouth 2 (Two) Times a Day Before Meals.        Pharmacy where request should be sent: [address] [phone] [facility] [phone] FX     Last office visit with prescribing clinician: 12/20/2023   Last telemedicine visit with prescribing clinician: Visit date not found   Next office visit with prescribing clinician: 4/22/2024     Additional details provided by patient: OUT OF BOTH. PLEASE DOUBLE CHECK ON GLIPIZIDE TO MAKE SURE THAT IS THE CORRECT RX. SHOULD THESE BE FOR 90 DAYS?    Does the patient have less than a 3 day supply:  [x] Yes  [] No    Would you like a call back once the refill request has been completed: [x] Yes [] No    If the office needs to give you a call back, can they leave a voicemail: [x] Yes [] No    Radha Wang Rep   01/17/24 13:49 EST

## 2024-02-02 DIAGNOSIS — E11.65 TYPE 2 DIABETES MELLITUS WITH HYPERGLYCEMIA, WITHOUT LONG-TERM CURRENT USE OF INSULIN: ICD-10-CM

## 2024-02-02 DIAGNOSIS — R80.9 TYPE 2 DIABETES MELLITUS WITH MICROALBUMINURIA, WITHOUT LONG-TERM CURRENT USE OF INSULIN: ICD-10-CM

## 2024-02-02 DIAGNOSIS — E11.29 TYPE 2 DIABETES MELLITUS WITH MICROALBUMINURIA, WITHOUT LONG-TERM CURRENT USE OF INSULIN: ICD-10-CM

## 2024-02-02 DIAGNOSIS — E11.42 TYPE 2 DIABETES MELLITUS WITH DIABETIC POLYNEUROPATHY, WITHOUT LONG-TERM CURRENT USE OF INSULIN: ICD-10-CM

## 2024-02-02 RX ORDER — METFORMIN HYDROCHLORIDE 500 MG/1
1000 TABLET, EXTENDED RELEASE ORAL
Qty: 360 TABLET | Refills: 0 | Status: SHIPPED | OUTPATIENT
Start: 2024-02-02

## 2024-02-02 NOTE — TELEPHONE ENCOUNTER
Caller: Jing Bojorquez    Relationship: Self    Best call back number: [phone]     Requested Prescriptions:   Requested Prescriptions     Pending Prescriptions Disp Refills    metFORMIN ER (GLUCOPHAGE-XR) 500 MG 24 hr tablet 360 tablet 1     Sig: Take 2 tablets by mouth Daily With Breakfast & Dinner.        Pharmacy where request should be sent: [facility] [address] [place] [address] [phone] [facility] [phone] FX     Last office visit with prescribing clinician: 12/20/2023   Last telemedicine visit with prescribing clinician: Visit date not found   Next office visit with prescribing clinician: 4/22/2024       Does the patient have less than a 3 day supply:  [x] Yes  [] No    Would you like a call back once the refill request has been completed: [] Yes [x] No    If the office needs to give you a call back, can they leave a voicemail: [] Yes [x] No    Radha Gomez Rep   02/02/24 11:12 EST

## 2024-03-09 ENCOUNTER — OFFICE VISIT (OUTPATIENT)
Dept: FAMILY MEDICINE CLINIC | Facility: CLINIC | Age: 59
End: 2024-03-09
Payer: MEDICAID

## 2024-03-09 VITALS
HEIGHT: 63 IN | BODY MASS INDEX: 22.96 KG/M2 | HEART RATE: 93 BPM | SYSTOLIC BLOOD PRESSURE: 144 MMHG | DIASTOLIC BLOOD PRESSURE: 82 MMHG | WEIGHT: 129.6 LBS | OXYGEN SATURATION: 97 %

## 2024-03-09 DIAGNOSIS — R05.1 ACUTE COUGH: Primary | ICD-10-CM

## 2024-03-09 LAB
EXPIRATION DATE: NORMAL
FLUAV AG UPPER RESP QL IA.RAPID: NOT DETECTED
FLUBV AG UPPER RESP QL IA.RAPID: NOT DETECTED
INTERNAL CONTROL: NORMAL
Lab: NORMAL
SARS-COV-2 AG UPPER RESP QL IA.RAPID: NOT DETECTED

## 2024-03-09 RX ORDER — TRIAMCINOLONE ACETONIDE 40 MG/ML
80 INJECTION, SUSPENSION INTRA-ARTICULAR; INTRAMUSCULAR ONCE
Status: COMPLETED | OUTPATIENT
Start: 2024-03-09 | End: 2024-03-09

## 2024-03-09 RX ADMIN — TRIAMCINOLONE ACETONIDE 80 MG: 40 INJECTION, SUSPENSION INTRA-ARTICULAR; INTRAMUSCULAR at 12:04

## 2024-03-09 NOTE — ASSESSMENT & PLAN NOTE
I do not feel that she has a bacterial infection at this time and suspect this is most likely allergy related.  Patient received Kenalog in office and I have encouraged her to add Flonase to her Zyrtec.  She has cough medication at home and will call if symptoms are persistent or worsening

## 2024-03-09 NOTE — PROGRESS NOTES
Date: 2024   Patient Name: Jing Bojorquez  : 1965   MRN: [id number]     Chief Complaint:    Chief Complaint   Patient presents with    Cough     Chest congestion, patient finished antibiotic few days ago        History of Present Illness: Jing Bojorquez is a 58 y.o. female who is here today for hacking cough, postnasal drainage, and nausea.  She recently finished a course of Bactrim for sinusitis a couple of days ago but reports persistent cough with thick postnasal drainage which is irritating her stomach and causing nausea and even vomiting of mucus.  Although persistent, symptoms have acutely worsened over the last couple of days with recent weather change.         Review of Systems:   Review of Systems   Constitutional:  Negative for chills, fatigue and fever.   HENT:  Positive for congestion, postnasal drip, sore throat, swollen glands and voice change.    Respiratory:  Positive for cough. Negative for shortness of breath.    Cardiovascular:  Negative for chest pain and palpitations.   Gastrointestinal:  Negative for abdominal pain, constipation, diarrhea, nausea and vomiting.   Musculoskeletal:  Negative for back pain and myalgias.   Neurological:  Negative for dizziness and headache.   Psychiatric/Behavioral:  Negative for depressed mood. The patient is not nervous/anxious.        Past Medical History: History reviewed. No pertinent past medical history.    Past Surgical History: History reviewed. No pertinent surgical history.    Family History: History reviewed. No pertinent family history.    Social History:   Social History     Socioeconomic History    Marital status: Single   Tobacco Use    Smoking status: Never     Passive exposure: Current    Smokeless tobacco: Never   Vaping Use    Vaping status: Never Used   Substance and Sexual Activity    Alcohol use: Never    Drug use: Never    Sexual activity: Yes     Partners: Female       Medications:     Current Outpatient Medications:      "amitriptyline (ELAVIL) 25 MG tablet, Take 1 tablet by mouth every night at bedtime., Disp: 90 tablet, Rfl: 1    aspirin 81 MG EC tablet, Take 1 tablet by mouth Daily., Disp: 90 tablet, Rfl: 1    atorvastatin (LIPITOR) 40 MG tablet, Take 1 tablet by mouth Daily. For heart protection and stroke prevention, Disp: 90 tablet, Rfl: 1    benazepril (LOTENSIN) 20 MG tablet, Take 1 tablet by mouth Daily., Disp: 90 tablet, Rfl: 1    Blood Glucose Monitoring Suppl (Blood Glucose Monitor System) w/Device kit, Check BG every morning before breakfast/eating  and as needed, Disp: 1 each, Rfl: 0    cetirizine (zyrTEC) 10 MG tablet, Take 1 tablet by mouth Daily., Disp: 90 tablet, Rfl: 1    glipizide (Glucotrol) 10 MG tablet, Take 2 tablets by mouth 2 (Two) Times a Day Before Meals., Disp: 360 tablet, Rfl: 1    metFORMIN ER (GLUCOPHAGE-XR) 500 MG 24 hr tablet, Take 2 tablets by mouth Daily With Breakfast & Dinner., Disp: 360 tablet, Rfl: 0    omeprazole (priLOSEC) 40 MG capsule, Take 1 capsule by mouth 2 (Two) Times a Day., Disp: 180 capsule, Rfl: 1  No current facility-administered medications for this visit.    Allergies:   Allergies   Allergen Reactions    Cefaclor Anaphylaxis    Penicillins Anaphylaxis    Azithromycin Hives    Morphine Headache    Metformin Other (See Comments)         Physical Exam:  Vital Signs:   Vitals:    03/09/24 1131   BP: 144/82   BP Location: Left arm   Patient Position: Sitting   Cuff Size: Adult   Pulse: 93   SpO2: 97%   Weight: 58.8 kg (129 lb 9.6 oz)   Height: 160 cm (63\")     Body mass index is 22.96 kg/m².     Physical Exam  Vitals and nursing note reviewed.   Constitutional:       Appearance: Normal appearance.   HENT:      Right Ear: No middle ear effusion.      Left Ear:  No middle ear effusion.      Nose:      Right Sinus: No maxillary sinus tenderness or frontal sinus tenderness.      Left Sinus: No maxillary sinus tenderness or frontal sinus tenderness.      Mouth/Throat:      Comments: Thick " postnasal drainage, mildly erythematous without exudate  Cardiovascular:      Rate and Rhythm: Normal rate and regular rhythm.      Heart sounds: Normal heart sounds. No murmur heard.  Pulmonary:      Effort: Pulmonary effort is normal.      Breath sounds: Normal breath sounds. No wheezing.      Comments: Hacking dry cough  Neurological:      Mental Status: She is alert and oriented to person, place, and time. Mental status is at baseline.   Psychiatric:         Mood and Affect: Mood normal.         Behavior: Behavior normal.           Assessment/Plan:   Diagnoses and all orders for this visit:    1. Acute cough (Primary)  Assessment & Plan:  I do not feel that she has a bacterial infection at this time and suspect this is most likely allergy related.  Patient received Kenalog in office and I have encouraged her to add Flonase to her Zyrtec.  She has cough medication at home and will call if symptoms are persistent or worsening    Orders:  -     POCT SARS-CoV-2 + Flu Antigen SWEETIE  -     triamcinolone acetonide (KENALOG-40) injection 80 mg           Follow Up:   Return if symptoms worsen or fail to improve.    Leeanna Granados, DO  [facility]

## 2024-03-29 ENCOUNTER — TELEPHONE (OUTPATIENT)
Dept: FAMILY MEDICINE CLINIC | Facility: CLINIC | Age: 59
End: 2024-03-29
Payer: MEDICAID

## 2024-03-29 ENCOUNTER — OFFICE VISIT (OUTPATIENT)
Dept: FAMILY MEDICINE CLINIC | Facility: CLINIC | Age: 59
End: 2024-03-29
Payer: MEDICAID

## 2024-03-29 VITALS
SYSTOLIC BLOOD PRESSURE: 116 MMHG | OXYGEN SATURATION: 100 % | DIASTOLIC BLOOD PRESSURE: 72 MMHG | WEIGHT: 124.3 LBS | BODY MASS INDEX: 22.02 KG/M2 | HEIGHT: 63 IN | HEART RATE: 58 BPM

## 2024-03-29 DIAGNOSIS — E11.65 TYPE 2 DIABETES MELLITUS WITH HYPERGLYCEMIA, WITHOUT LONG-TERM CURRENT USE OF INSULIN: ICD-10-CM

## 2024-03-29 DIAGNOSIS — B37.0 THRUSH: ICD-10-CM

## 2024-03-29 DIAGNOSIS — R35.0 URINARY FREQUENCY: Primary | ICD-10-CM

## 2024-03-29 LAB
BILIRUB BLD-MCNC: NEGATIVE MG/DL
CLARITY, POC: ABNORMAL
COLOR UR: YELLOW
EXPIRATION DATE: ABNORMAL
EXPIRATION DATE: ABNORMAL
GLUCOSE UR STRIP-MCNC: ABNORMAL MG/DL
HBA1C MFR BLD: 11.9 % (ref 4.5–5.7)
KETONES UR QL: NEGATIVE
LEUKOCYTE EST, POC: NEGATIVE
Lab: ABNORMAL
Lab: ABNORMAL
NITRITE UR-MCNC: NEGATIVE MG/ML
PH UR: 5 [PH] (ref 5–8)
PROT UR STRIP-MCNC: NEGATIVE MG/DL
RBC # UR STRIP: NEGATIVE /UL
SP GR UR: 1.01 (ref 1–1.03)
UROBILINOGEN UR QL: ABNORMAL

## 2024-03-29 RX ORDER — CAPSAICIN 8 %
8 KIT TOPICAL
COMMUNITY
Start: 2024-03-26

## 2024-03-29 RX ORDER — FLUCONAZOLE 150 MG/1
TABLET ORAL
Qty: 2 TABLET | Refills: 1 | Status: SHIPPED | OUTPATIENT
Start: 2024-03-29

## 2024-03-29 NOTE — ASSESSMENT & PLAN NOTE
Diabetes is worsening.   Patient was encouraged to get a sooner appointment with her PCP to discuss necessary medication changes.  She was counseled about the dangers of blood sugars being this high and risk for DKA.  Her sugars are most likely what is causing the majority of her symptoms.  Diabetes will be reassessed  by PCP

## 2024-03-29 NOTE — TELEPHONE ENCOUNTER
Patient had a same day visit with  today and was advised she needed to see  sooner than 4/22 due to her sugar being out of control, nothing is open please advise.

## 2024-03-29 NOTE — PROGRESS NOTES
"       Date: 2024   Patient Name: Jing Bojorquez  : 1965   MRN: [id number]     Chief Complaint:    Chief Complaint   Patient presents with    Sore Throat     Was seen two weeks ago and given steroid injection for drainage and chest congestion.     Urinary Frequency     Complains of lower back pain and abdominal pain as well for the past three days        History of Present Illness: Jing Bojorquez is a 58 y.o. female who is here today for sore throat and urinary frequency.  She was seen 2 weeks ago and swabbed for COVID and flu which were negative.  She was treated with an IM steroid but has not had any improvement.  She reports it feels like she has \" stitches in her throat\".    She also reports significant urinary frequency and urgency with some urinary incontinence which is not usual for her.  Of note, she is an uncontrolled diabetic and most recent A1c in September was 10.1.  She does not currently check her blood sugars and does not abide by diabetic diet.         Review of Systems:   Review of Systems   Constitutional:  Negative for chills, fatigue and fever.   HENT:  Positive for postnasal drip and sore throat.    Respiratory:  Negative for cough and shortness of breath.    Cardiovascular:  Negative for chest pain and palpitations.   Gastrointestinal:  Negative for abdominal pain, constipation, diarrhea, nausea and vomiting.   Genitourinary:  Positive for frequency, urgency and urinary incontinence.   Musculoskeletal:  Negative for back pain and myalgias.   Neurological:  Negative for dizziness and headache.   Psychiatric/Behavioral:  Negative for depressed mood. The patient is not nervous/anxious.        Past Medical History:   Past Medical History:   Diagnosis Date    Allergic     Diabetes mellitus     Hyperlipidemia        Past Surgical History:   Past Surgical History:   Procedure Laterality Date    APPENDECTOMY      CHOLECYSTECTOMY         Family History:   Family History   Problem Relation Age " of Onset    Hypertension Mother     Diabetes Mother     Cancer Mother     Hypertension Father     Diabetes Father        Social History:   Social History     Socioeconomic History    Marital status: Single   Tobacco Use    Smoking status: Never     Passive exposure: Current    Smokeless tobacco: Never   Vaping Use    Vaping status: Never Used   Substance and Sexual Activity    Alcohol use: Never    Drug use: Never    Sexual activity: Yes     Partners: Female       Medications:     Current Outpatient Medications:     amitriptyline (ELAVIL) 25 MG tablet, Take 1 tablet by mouth every night at bedtime., Disp: 90 tablet, Rfl: 1    aspirin 81 MG EC tablet, Take 1 tablet by mouth Daily., Disp: 90 tablet, Rfl: 1    atorvastatin (LIPITOR) 40 MG tablet, Take 1 tablet by mouth Daily. For heart protection and stroke prevention, Disp: 90 tablet, Rfl: 1    benazepril (LOTENSIN) 20 MG tablet, Take 1 tablet by mouth Daily., Disp: 90 tablet, Rfl: 1    Blood Glucose Monitoring Suppl (Blood Glucose Monitor System) w/Device kit, Check BG every morning before breakfast/eating  and as needed, Disp: 1 each, Rfl: 0    cetirizine (zyrTEC) 10 MG tablet, Take 1 tablet by mouth Daily., Disp: 90 tablet, Rfl: 1    glipizide (Glucotrol) 10 MG tablet, Take 2 tablets by mouth 2 (Two) Times a Day Before Meals., Disp: 360 tablet, Rfl: 1    metFORMIN ER (GLUCOPHAGE-XR) 500 MG 24 hr tablet, Take 2 tablets by mouth Daily With Breakfast & Dinner., Disp: 360 tablet, Rfl: 0    omeprazole (priLOSEC) 40 MG capsule, Take 1 capsule by mouth 2 (Two) Times a Day., Disp: 180 capsule, Rfl: 1    Qutenza, 4 Patch, 8 % kit topical system, Apply 8 patches topically to the appropriate area as directed Every 3 (Three) Months., Disp: , Rfl:     fluconazole (Diflucan) 150 MG tablet, Take 1 tab PO on day 1 then 1 tab PO on day 3, Disp: 2 tablet, Rfl: 1    nystatin (MYCOSTATIN) 100,000 unit/mL suspension, Swish and swallow 5 mL 4 (Four) Times a Day., Disp: 473 mL, Rfl:  "0    Allergies:   Allergies   Allergen Reactions    Cefaclor Anaphylaxis    Penicillins Anaphylaxis    Azithromycin Hives    Morphine Headache    Metformin Other (See Comments)         Physical Exam:  Vital Signs:   Vitals:    03/29/24 1304   BP: 116/72   BP Location: Left arm   Patient Position: Sitting   Cuff Size: Adult   Pulse: 58   SpO2: 100%   Weight: 56.4 kg (124 lb 4.8 oz)   Height: 160 cm (63\")     Body mass index is 22.02 kg/m².     Physical Exam  Vitals and nursing note reviewed.   Constitutional:       Comments: Chronically ill-appearing   HENT:      Mouth/Throat:      Mouth: Mucous membranes are moist.      Comments: White patches in the oropharynx and on the tongue, mild clear postnasal drainage  Cardiovascular:      Rate and Rhythm: Normal rate and regular rhythm.      Heart sounds: Normal heart sounds. No murmur heard.  Pulmonary:      Effort: Pulmonary effort is normal.      Breath sounds: Normal breath sounds. No wheezing.   Abdominal:      General: Bowel sounds are normal.      Palpations: Abdomen is soft.      Tenderness: There is no abdominal tenderness.   Neurological:      Mental Status: She is alert and oriented to person, place, and time.   Psychiatric:         Mood and Affect: Mood normal.         Behavior: Behavior normal.           Assessment/Plan:   Diagnoses and all orders for this visit:    1. Urinary frequency (Primary)  Assessment & Plan:  UA is negative for infection however has a large amount of glucose and patient is not on an SGLT2.  With most recent A1c over 6 months ago at 10.1, suspect her frequency and urgency as well as incontinence are secondary to significantly elevated blood sugars.    Orders:  -     POC Urinalysis Dipstick, Automated    2. Thrush  Assessment & Plan:  I suspect this is secondary to significant elevation of blood sugars.  Discussed importance of blood sugar control with the patient and the likelihood of reoccurrence should sugars remain uncontrolled.  She " has an upcoming appointment with her PCP    Orders:  -     nystatin (MYCOSTATIN) 100,000 unit/mL suspension; Swish and swallow 5 mL 4 (Four) Times a Day.  Dispense: 473 mL; Refill: 0  -     fluconazole (Diflucan) 150 MG tablet; Take 1 tab PO on day 1 then 1 tab PO on day 3  Dispense: 2 tablet; Refill: 1    3. Type 2 diabetes mellitus with hyperglycemia, without long-term current use of insulin  Assessment & Plan:  Diabetes is worsening.   Patient was encouraged to get a sooner appointment with her PCP to discuss necessary medication changes.  She was counseled about the dangers of blood sugars being this high and risk for DKA.  Her sugars are most likely what is causing the majority of her symptoms.  Diabetes will be reassessed  by PCP    Orders:  -     POC Glycosylated Hemoglobin (Hb A1C)           Follow Up:   Return if symptoms worsen or fail to improve.    Leeanna Granados, DO  [facility]

## 2024-03-29 NOTE — ASSESSMENT & PLAN NOTE
UA is negative for infection however has a large amount of glucose and patient is not on an SGLT2.  With most recent A1c over 6 months ago at 10.1, suspect her frequency and urgency as well as incontinence are secondary to significantly elevated blood sugars.

## 2024-03-29 NOTE — ASSESSMENT & PLAN NOTE
I suspect this is secondary to significant elevation of blood sugars.  Discussed importance of blood sugar control with the patient and the likelihood of reoccurrence should sugars remain uncontrolled.  She has an upcoming appointment with her PCP

## 2024-04-05 ENCOUNTER — TELEMEDICINE (OUTPATIENT)
Dept: FAMILY MEDICINE CLINIC | Facility: CLINIC | Age: 59
End: 2024-04-05
Payer: MEDICAID

## 2024-04-05 DIAGNOSIS — E11.29 TYPE 2 DIABETES MELLITUS WITH MICROALBUMINURIA, WITHOUT LONG-TERM CURRENT USE OF INSULIN: ICD-10-CM

## 2024-04-05 DIAGNOSIS — E78.2 HYPERLIPIDEMIA, MIXED: ICD-10-CM

## 2024-04-05 DIAGNOSIS — E11.42 TYPE 2 DIABETES MELLITUS WITH DIABETIC POLYNEUROPATHY, WITHOUT LONG-TERM CURRENT USE OF INSULIN: ICD-10-CM

## 2024-04-05 DIAGNOSIS — E11.65 TYPE 2 DIABETES MELLITUS WITH HYPERGLYCEMIA, WITHOUT LONG-TERM CURRENT USE OF INSULIN: Primary | ICD-10-CM

## 2024-04-05 DIAGNOSIS — E11.65 TYPE 2 DIABETES MELLITUS WITH HYPERGLYCEMIA, WITHOUT LONG-TERM CURRENT USE OF INSULIN: ICD-10-CM

## 2024-04-05 DIAGNOSIS — R80.9 TYPE 2 DIABETES MELLITUS WITH MICROALBUMINURIA, WITHOUT LONG-TERM CURRENT USE OF INSULIN: ICD-10-CM

## 2024-04-05 PROCEDURE — 3046F HEMOGLOBIN A1C LEVEL >9.0%: CPT | Performed by: FAMILY MEDICINE

## 2024-04-05 PROCEDURE — 99213 OFFICE O/P EST LOW 20 MIN: CPT | Performed by: FAMILY MEDICINE

## 2024-04-05 RX ORDER — ATORVASTATIN CALCIUM 40 MG/1
40 TABLET, FILM COATED ORAL DAILY
Qty: 90 TABLET | Refills: 1 | Status: SHIPPED | OUTPATIENT
Start: 2024-04-05

## 2024-04-05 RX ORDER — DAPAGLIFLOZIN 10 MG/1
10 TABLET, FILM COATED ORAL DAILY
Qty: 90 TABLET | Refills: 1 | Status: SHIPPED | OUTPATIENT
Start: 2024-04-05 | End: 2024-04-22 | Stop reason: SDUPTHER

## 2024-04-05 NOTE — PROGRESS NOTES
Patient was seen today through synchronous audio/video technology. Verbal consent was obtained. The patient was located at home. Dr. Granados was located at [facility] in [place], [place]. Vitals signs were not obtained due to lack of home monitoring access.   \       Date: 2024   Patient Name: Jing Bojorquez  : 1965   MRN: [id number]     Chief Complaint:    Chief Complaint   Patient presents with    Diabetes       History of Present Illness: Jing Bojorquez is a 58 y.o. female who is here today to follow up for diabetes. Pt seen in weekend clinic with multiple symptoms consistent with hyperglycemia. POC A1c was greater than 11, she is agreeable to discussing treatment options.        Review of Systems:   Review of Systems   Constitutional:  Negative for chills, fatigue and fever.   Respiratory:  Negative for cough and shortness of breath.    Cardiovascular:  Negative for chest pain and palpitations.   Gastrointestinal:  Negative for abdominal pain, constipation, diarrhea, nausea and vomiting.   Musculoskeletal:  Negative for back pain and myalgias.   Neurological:  Negative for dizziness and headache.   Psychiatric/Behavioral:  Negative for depressed mood. The patient is not nervous/anxious.        Past Medical History:   Past Medical History:   Diagnosis Date    Allergic     Diabetes mellitus     Hyperlipidemia        Past Surgical History:   Past Surgical History:   Procedure Laterality Date    APPENDECTOMY      CHOLECYSTECTOMY         Family History:   Family History   Problem Relation Age of Onset    Hypertension Mother     Diabetes Mother     Cancer Mother     Hypertension Father     Diabetes Father        Social History:   Social History     Socioeconomic History    Marital status: Single   Tobacco Use    Smoking status: Never     Passive exposure: Current    Smokeless tobacco: Never   Vaping Use    Vaping status: Never Used   Substance and Sexual Activity    Alcohol use: Never     Drug use: Never    Sexual activity: Yes     Partners: Female       Medications:     Current Outpatient Medications:     amitriptyline (ELAVIL) 25 MG tablet, Take 1 tablet by mouth every night at bedtime., Disp: 90 tablet, Rfl: 1    aspirin 81 MG EC tablet, Take 1 tablet by mouth Daily., Disp: 90 tablet, Rfl: 1    atorvastatin (LIPITOR) 40 MG tablet, Take 1 tablet by mouth Daily. For heart protection and stroke prevention, Disp: 90 tablet, Rfl: 1    benazepril (LOTENSIN) 20 MG tablet, Take 1 tablet by mouth Daily., Disp: 90 tablet, Rfl: 1    Blood Glucose Monitoring Suppl (Blood Glucose Monitor System) w/Device kit, Check BG every morning before breakfast/eating  and as needed, Disp: 1 each, Rfl: 0    cetirizine (zyrTEC) 10 MG tablet, Take 1 tablet by mouth Daily., Disp: 90 tablet, Rfl: 1    glipizide (Glucotrol) 10 MG tablet, Take 2 tablets by mouth 2 (Two) Times a Day Before Meals., Disp: 360 tablet, Rfl: 1    metFORMIN ER (GLUCOPHAGE-XR) 500 MG 24 hr tablet, Take 2 tablets by mouth Daily With Breakfast & Dinner., Disp: 360 tablet, Rfl: 0    omeprazole (priLOSEC) 40 MG capsule, Take 1 capsule by mouth 2 (Two) Times a Day., Disp: 180 capsule, Rfl: 1    Qutenza, 4 Patch, 8 % kit topical system, Apply 8 patches topically to the appropriate area as directed Every 3 (Three) Months., Disp: , Rfl:     dapagliflozin Propanediol (Farxiga) 10 MG tablet, Take 10 mg by mouth Daily., Disp: 90 tablet, Rfl: 1    nystatin (MYCOSTATIN) 100,000 unit/mL suspension, Swish and swallow 5 mL 4 (Four) Times a Day., Disp: 473 mL, Rfl: 0    Allergies:   Allergies   Allergen Reactions    Cefaclor Anaphylaxis    Penicillins Anaphylaxis    Azithromycin Hives    Morphine Headache    Ciprofloxacin Rash    Metformin Other (See Comments)       PHQ-2 Total Score:     PHQ-9 Total Score:       Physical Exam:  Vital Signs: There were no vitals filed for this visit.  There is no height or weight on file to calculate BMI.     Physical Exam  Vitals and  nursing note reviewed.   Constitutional:       Comments: Chronically ill appearing   HENT:      Head: Normocephalic.   Pulmonary:      Effort: Pulmonary effort is normal.   Neurological:      Mental Status: She is alert and oriented to person, place, and time.   Psychiatric:         Mood and Affect: Mood normal.         Behavior: Behavior normal.           Assessment/Plan:   Diagnoses and all orders for this visit:    1. Type 2 diabetes mellitus with hyperglycemia, without long-term current use of insulin (Primary)  Assessment & Plan:  Diabetes is worsening.   Recommended an ADA diet.  Regular aerobic exercise.  Discussed ways to avoid symptomatic hypoglycemia.  Rx Farxiga 10mg daily and pt will f/u with her PCP  Diabetes will be reassessed in 1 month    Orders:  -     dapagliflozin Propanediol (Farxiga) 10 MG tablet; Take 10 mg by mouth Daily.  Dispense: 90 tablet; Refill: 1           Follow Up:   No follow-ups on file.    Leeanna Granados DO  [facility]

## 2024-04-05 NOTE — TELEPHONE ENCOUNTER
Caller: Jing Bojorquez    Relationship: Self    Best call back number: [phone]    Requested Prescriptions:   Requested Prescriptions     Pending Prescriptions Disp Refills    atorvastatin (LIPITOR) 40 MG tablet 90 tablet 1     Sig: Take 1 tablet by mouth Daily. For heart protection and stroke prevention        Pharmacy where request should be sent: [facility] [address] [place] [address] [phone] [facility] [phone] [facility]     Last office visit with prescribing clinician: 12/20/2023   Last telemedicine visit with prescribing clinician: Visit date not found   Next office visit with prescribing clinician: 4/22/2024     Additional details provided by patient: COMPLETELY OUT    Does the patient have less than a 3 day supply:  [x] Yes  [] No    Would you like a call back once the refill request has been completed: [] Yes [x] No    If the office needs to give you a call back, can they leave a voicemail: [] Yes [x] No    Radha Calle   04/05/24 08:17 EDT

## 2024-04-06 ENCOUNTER — OFFICE VISIT (OUTPATIENT)
Dept: FAMILY MEDICINE CLINIC | Facility: CLINIC | Age: 59
End: 2024-04-06
Payer: MEDICAID

## 2024-04-06 VITALS
TEMPERATURE: 98.2 F | HEART RATE: 77 BPM | BODY MASS INDEX: 22.5 KG/M2 | OXYGEN SATURATION: 97 % | SYSTOLIC BLOOD PRESSURE: 140 MMHG | DIASTOLIC BLOOD PRESSURE: 84 MMHG | WEIGHT: 127 LBS

## 2024-04-06 DIAGNOSIS — I10 HYPERTENSION, ESSENTIAL: Chronic | ICD-10-CM

## 2024-04-06 DIAGNOSIS — B37.0 THRUSH: ICD-10-CM

## 2024-04-06 DIAGNOSIS — J01.00 ACUTE NON-RECURRENT MAXILLARY SINUSITIS: ICD-10-CM

## 2024-04-06 DIAGNOSIS — E11.65 TYPE 2 DIABETES MELLITUS WITH HYPERGLYCEMIA, WITHOUT LONG-TERM CURRENT USE OF INSULIN: ICD-10-CM

## 2024-04-06 DIAGNOSIS — H92.01 OTALGIA, RIGHT: Primary | ICD-10-CM

## 2024-04-06 RX ORDER — DOXYCYCLINE HYCLATE 100 MG/1
100 CAPSULE ORAL 2 TIMES DAILY
Qty: 20 CAPSULE | Refills: 0 | Status: SHIPPED | OUTPATIENT
Start: 2024-04-06 | End: 2024-04-16

## 2024-04-06 NOTE — ASSESSMENT & PLAN NOTE
Recent prescription for nystatin to [facility] pharmacy given [facility] pharmacy [place] currently out of nystatin.  Discussed need to take this 48 hours after symptoms resolved to ensure treatment and work on better diabetes control.

## 2024-04-06 NOTE — ASSESSMENT & PLAN NOTE
Mild blood pressure elevation today but was normal last week.  No medication changes and likely related to poor sleep and ongoing ear and sinus pain.  We will recheck at follow-up visit later this month

## 2024-04-06 NOTE — ASSESSMENT & PLAN NOTE
Discussed serous effusion with bulging TM and maxillary sinus tenderness.  Given her multitude of antibiotic allergies we will treat with doxycycline for maxillary sinusitis and right otitis media.  Discussed medication use and side effects.  Close follow-up if no improvement or worsening.    We did discuss better follow-up regarding her poor diabetes control and improve diabetes management will help reduce her risk of recurrent infections.  She does have a follow-up later this month and is planning to add in Farxiga for poorly controlled diabetes.

## 2024-04-06 NOTE — PROGRESS NOTES
Chief Complaint  Earache (Right started yesterday )    Subjective    History of Present Illness:  Jing Bojorquez is a 58 y.o. female who presents today for worsening right earache and sinus pain and pressure along right maxillary sinus.  She was seen and diagnosed with thrush but has not been able to start on nystatin given her [facility] pharmacy is currently out of nystatin.    She did also have Farxiga added to her regimen given poorly controlled diabetes and has not done well with follow-up visits related to diabetes and understands importance to keep her follow-up visit with me later this month to get up-to-date with lab work.    She is also waiting on Farxiga from her pharmacy.    No hearing loss.  No drainage from her right ear.  Mild left ear fullness also present    Objective   Vital Signs:   /84   Pulse 77   Temp 98.2 °F (36.8 °C)   Wt 57.6 kg (127 lb)   SpO2 97%   BMI 22.50 kg/m²     Review of Systems   Constitutional:  Negative for appetite change, chills and fever.   HENT:  Positive for congestion, ear pain and sinus pressure. Negative for ear discharge, hearing loss, sore throat, swollen glands, tinnitus and trouble swallowing.         Ongoing thrush   Eyes:  Negative for blurred vision.   Respiratory:  Negative for chest tightness.    Cardiovascular:  Negative for chest pain.   Gastrointestinal:  Negative for abdominal pain.   Musculoskeletal:  Negative for gait problem.   Skin:  Negative for rash.   Psychiatric/Behavioral:  Negative for depressed mood.        Past History:  Medical History: has a past medical history of Allergic, Diabetes mellitus, and Hyperlipidemia.   Surgical History: has a past surgical history that includes Appendectomy and Cholecystectomy.   Family History: family history includes Cancer in her mother; Diabetes in her father and mother; Hypertension in her father and mother.   Social History: reports that she has never smoked. She has been exposed to tobacco smoke. She  has never used smokeless tobacco. She reports that she does not drink alcohol and does not use drugs.      Current Outpatient Medications:     amitriptyline (ELAVIL) 25 MG tablet, Take 1 tablet by mouth every night at bedtime., Disp: 90 tablet, Rfl: 1    aspirin 81 MG EC tablet, Take 1 tablet by mouth Daily., Disp: 90 tablet, Rfl: 1    atorvastatin (LIPITOR) 40 MG tablet, Take 1 tablet by mouth Daily. For heart protection and stroke prevention, Disp: 90 tablet, Rfl: 1    benazepril (LOTENSIN) 20 MG tablet, Take 1 tablet by mouth Daily., Disp: 90 tablet, Rfl: 1    Blood Glucose Monitoring Suppl (Blood Glucose Monitor System) w/Device kit, Check BG every morning before breakfast/eating  and as needed, Disp: 1 each, Rfl: 0    cetirizine (zyrTEC) 10 MG tablet, Take 1 tablet by mouth Daily., Disp: 90 tablet, Rfl: 1    dapagliflozin Propanediol (Farxiga) 10 MG tablet, Take 10 mg by mouth Daily., Disp: 90 tablet, Rfl: 1    glipizide (Glucotrol) 10 MG tablet, Take 2 tablets by mouth 2 (Two) Times a Day Before Meals., Disp: 360 tablet, Rfl: 1    metFORMIN ER (GLUCOPHAGE-XR) 500 MG 24 hr tablet, Take 2 tablets by mouth Daily With Breakfast & Dinner., Disp: 360 tablet, Rfl: 0    nystatin (MYCOSTATIN) 100,000 unit/mL suspension, Swish and swallow 5 mL 4 (Four) Times a Day., Disp: 473 mL, Rfl: 0    omeprazole (priLOSEC) 40 MG capsule, Take 1 capsule by mouth 2 (Two) Times a Day., Disp: 180 capsule, Rfl: 1    Qutenza, 4 Patch, 8 % kit topical system, Apply 8 patches topically to the appropriate area as directed Every 3 (Three) Months., Disp: , Rfl:     doxycycline (VIBRAMYCIN) 100 MG capsule, Take 1 capsule by mouth 2 (Two) Times a Day for 10 days. (Take 2 hrs after eating with full glass of water to prevent GI upset), Disp: 20 capsule, Rfl: 0    Allergies: Cefaclor, Penicillins, Azithromycin, Morphine, Ciprofloxacin, and Metformin    Physical Exam  Constitutional:       Appearance: Normal appearance.   HENT:      Head:  Normocephalic.      Right Ear: Ear canal and external ear normal.      Left Ear: Tympanic membrane, ear canal and external ear normal.      Ears:      Comments: Mild right serous effusion with bulging TM     Nose: Nose normal.      Comments: Tender maxillary sinus on the right side     Mouth/Throat:      Mouth: Mucous membranes are moist.      Comments: Thrush  Eyes:      Extraocular Movements: Extraocular movements intact.      Conjunctiva/sclera: Conjunctivae normal.      Pupils: Pupils are equal, round, and reactive to light.   Cardiovascular:      Rate and Rhythm: Normal rate and regular rhythm.      Heart sounds: Normal heart sounds.   Pulmonary:      Effort: Pulmonary effort is normal.      Breath sounds: Normal breath sounds.   Musculoskeletal:         General: Normal range of motion.      Cervical back: Normal range of motion.   Skin:     General: Skin is warm and dry.   Neurological:      General: No focal deficit present.      Mental Status: She is alert.   Psychiatric:         Mood and Affect: Mood normal.         Behavior: Behavior normal.         Thought Content: Thought content normal.          Result Review                   Assessment and Plan  Diagnoses and all orders for this visit:    1. Otalgia, right (Primary)  Assessment & Plan:  Discussed serous effusion with bulging TM and maxillary sinus tenderness.  Given her multitude of antibiotic allergies we will treat with doxycycline for maxillary sinusitis and right otitis media.  Discussed medication use and side effects.  Close follow-up if no improvement or worsening.    We did discuss better follow-up regarding her poor diabetes control and improve diabetes management will help reduce her risk of recurrent infections.  She does have a follow-up later this month and is planning to add in Farxiga for poorly controlled diabetes.      2. Acute non-recurrent maxillary sinusitis  Assessment & Plan:  See above    Orders:  -     doxycycline (VIBRAMYCIN)  100 MG capsule; Take 1 capsule by mouth 2 (Two) Times a Day for 10 days. (Take 2 hrs after eating with full glass of water to prevent GI upset)  Dispense: 20 capsule; Refill: 0    3. HTN  Assessment & Plan:  Mild blood pressure elevation today but was normal last week.  No medication changes and likely related to poor sleep and ongoing ear and sinus pain.  We will recheck at follow-up visit later this month      4. Thrush  Assessment & Plan:  Recent prescription for nystatin to [facility] pharmacy given [facility] pharmacy [place] currently out of nystatin.  Discussed need to take this 48 hours after symptoms resolved to ensure treatment and work on better diabetes control.    Orders:  -     nystatin (MYCOSTATIN) 100,000 unit/mL suspension; Swish and swallow 5 mL 4 (Four) Times a Day.  Dispense: 473 mL; Refill: 0    5. Type 2 diabetes mellitus with hyperglycemia, without long-term current use of insulin  Assessment & Plan:  Discussed poorly controlled diabetes likely contributing to thrush.  She does have Farxiga added to her regimen with follow-up planned later this month.          BMI is within normal parameters. No other follow-up for BMI required.          Follow Up  Return in about 16 days (around 4/22/2024) for Med recheck.    Norbert Cardenas MD

## 2024-04-06 NOTE — ASSESSMENT & PLAN NOTE
Discussed poorly controlled diabetes likely contributing to thrush.  She does have Farxiga added to her regimen with follow-up planned later this month.

## 2024-04-22 ENCOUNTER — OFFICE VISIT (OUTPATIENT)
Dept: FAMILY MEDICINE CLINIC | Facility: CLINIC | Age: 59
End: 2024-04-22
Payer: MEDICAID

## 2024-04-22 VITALS
DIASTOLIC BLOOD PRESSURE: 70 MMHG | HEIGHT: 64 IN | BODY MASS INDEX: 21.12 KG/M2 | WEIGHT: 123.7 LBS | OXYGEN SATURATION: 98 % | SYSTOLIC BLOOD PRESSURE: 140 MMHG | HEART RATE: 79 BPM

## 2024-04-22 DIAGNOSIS — E78.2 HYPERLIPIDEMIA, MIXED: Chronic | ICD-10-CM

## 2024-04-22 DIAGNOSIS — I10 HYPERTENSION, ESSENTIAL: Chronic | ICD-10-CM

## 2024-04-22 DIAGNOSIS — B37.0 THRUSH: ICD-10-CM

## 2024-04-22 DIAGNOSIS — R05.1 ACUTE COUGH: ICD-10-CM

## 2024-04-22 DIAGNOSIS — E11.29 TYPE 2 DIABETES MELLITUS WITH MICROALBUMINURIA, WITHOUT LONG-TERM CURRENT USE OF INSULIN: Chronic | ICD-10-CM

## 2024-04-22 DIAGNOSIS — G62.9 NEUROPATHY: Chronic | ICD-10-CM

## 2024-04-22 DIAGNOSIS — R80.9 TYPE 2 DIABETES MELLITUS WITH MICROALBUMINURIA, WITHOUT LONG-TERM CURRENT USE OF INSULIN: Chronic | ICD-10-CM

## 2024-04-22 DIAGNOSIS — E11.40 TYPE 2 DIABETES MELLITUS WITH DIABETIC NEUROPATHY, WITHOUT LONG-TERM CURRENT USE OF INSULIN: ICD-10-CM

## 2024-04-22 DIAGNOSIS — J01.00 ACUTE NON-RECURRENT MAXILLARY SINUSITIS: ICD-10-CM

## 2024-04-22 DIAGNOSIS — J30.1 SEASONAL ALLERGIC RHINITIS DUE TO POLLEN: Chronic | ICD-10-CM

## 2024-04-22 DIAGNOSIS — E11.65 TYPE 2 DIABETES MELLITUS WITH HYPERGLYCEMIA, WITHOUT LONG-TERM CURRENT USE OF INSULIN: Primary | ICD-10-CM

## 2024-04-22 DIAGNOSIS — K21.9 GERD WITHOUT ESOPHAGITIS: Chronic | ICD-10-CM

## 2024-04-22 DIAGNOSIS — R79.89 ELEVATED LIVER FUNCTION TESTS: Chronic | ICD-10-CM

## 2024-04-22 PROBLEM — R35.0 URINARY FREQUENCY: Status: RESOLVED | Noted: 2024-03-29 | Resolved: 2024-04-22

## 2024-04-22 PROBLEM — H92.01 OTALGIA, RIGHT: Status: RESOLVED | Noted: 2024-04-06 | Resolved: 2024-04-22

## 2024-04-22 LAB
EXPIRATION DATE: NORMAL
EXPIRATION DATE: NORMAL
FLUAV AG NPH QL: NEGATIVE
FLUBV AG NPH QL: NEGATIVE
INTERNAL CONTROL: NORMAL
INTERNAL CONTROL: NORMAL
Lab: NORMAL
Lab: NORMAL
SARS-COV-2 AG UPPER RESP QL IA.RAPID: NOT DETECTED

## 2024-04-22 PROCEDURE — 1159F MED LIST DOCD IN RCRD: CPT | Performed by: FAMILY MEDICINE

## 2024-04-22 PROCEDURE — 87426 SARSCOV CORONAVIRUS AG IA: CPT | Performed by: FAMILY MEDICINE

## 2024-04-22 PROCEDURE — 87804 INFLUENZA ASSAY W/OPTIC: CPT | Performed by: FAMILY MEDICINE

## 2024-04-22 PROCEDURE — 3077F SYST BP >= 140 MM HG: CPT | Performed by: FAMILY MEDICINE

## 2024-04-22 PROCEDURE — 3078F DIAST BP <80 MM HG: CPT | Performed by: FAMILY MEDICINE

## 2024-04-22 PROCEDURE — 1160F RVW MEDS BY RX/DR IN RCRD: CPT | Performed by: FAMILY MEDICINE

## 2024-04-22 PROCEDURE — 3046F HEMOGLOBIN A1C LEVEL >9.0%: CPT | Performed by: FAMILY MEDICINE

## 2024-04-22 PROCEDURE — 99214 OFFICE O/P EST MOD 30 MIN: CPT | Performed by: FAMILY MEDICINE

## 2024-04-22 RX ORDER — DEXTROMETHORPHAN HYDROBROMIDE AND PROMETHAZINE HYDROCHLORIDE 15; 6.25 MG/5ML; MG/5ML
5 SYRUP ORAL 4 TIMES DAILY PRN
Qty: 240 ML | Refills: 3 | Status: SHIPPED | OUTPATIENT
Start: 2024-04-22

## 2024-04-22 RX ORDER — DOXYCYCLINE HYCLATE 100 MG/1
100 CAPSULE ORAL 2 TIMES DAILY
Qty: 14 CAPSULE | Refills: 0 | Status: SHIPPED | OUTPATIENT
Start: 2024-04-22 | End: 2024-04-29

## 2024-04-22 RX ORDER — GLIPIZIDE 10 MG/1
20 TABLET ORAL
Qty: 360 TABLET | Refills: 1 | Status: SHIPPED | OUTPATIENT
Start: 2024-04-22

## 2024-04-22 RX ORDER — CETIRIZINE HYDROCHLORIDE 10 MG/1
10 TABLET ORAL DAILY
Qty: 90 TABLET | Refills: 1 | Status: SHIPPED | OUTPATIENT
Start: 2024-04-22

## 2024-04-22 RX ORDER — BENAZEPRIL HYDROCHLORIDE 20 MG/1
20 TABLET ORAL DAILY
Qty: 90 TABLET | Refills: 1 | Status: SHIPPED | OUTPATIENT
Start: 2024-04-22

## 2024-04-22 RX ORDER — ATORVASTATIN CALCIUM 40 MG/1
40 TABLET, FILM COATED ORAL DAILY
Qty: 90 TABLET | Refills: 1 | Status: SHIPPED | OUTPATIENT
Start: 2024-04-22

## 2024-04-22 RX ORDER — ASPIRIN 81 MG/1
81 TABLET ORAL DAILY
Qty: 90 TABLET | Refills: 1 | Status: SHIPPED | OUTPATIENT
Start: 2024-04-22

## 2024-04-22 RX ORDER — AMITRIPTYLINE HYDROCHLORIDE 25 MG/1
25 TABLET, FILM COATED ORAL
Qty: 90 TABLET | Refills: 1 | Status: SHIPPED | OUTPATIENT
Start: 2024-04-22

## 2024-04-22 RX ORDER — OMEPRAZOLE 40 MG/1
40 CAPSULE, DELAYED RELEASE ORAL 2 TIMES DAILY
Qty: 180 CAPSULE | Refills: 1 | Status: SHIPPED | OUTPATIENT
Start: 2024-04-22

## 2024-04-22 RX ORDER — METFORMIN HYDROCHLORIDE 500 MG/1
1000 TABLET, EXTENDED RELEASE ORAL
Qty: 360 TABLET | Refills: 1 | Status: SHIPPED | OUTPATIENT
Start: 2024-04-22

## 2024-04-22 RX ORDER — DAPAGLIFLOZIN 10 MG/1
10 TABLET, FILM COATED ORAL DAILY
Qty: 90 TABLET | Refills: 1 | Status: SHIPPED | OUTPATIENT
Start: 2024-04-22

## 2024-04-22 NOTE — ASSESSMENT & PLAN NOTE
Mild elevation today but did not sleep well with URI/cough.    No med changes but will recheck at followup

## 2024-04-22 NOTE — ASSESSMENT & PLAN NOTE
Added phenerganDM.  If worsening facial pain/pressure/purulent drainage she was given doxy to add-in but will try and hold off on antibiotics unless worsening

## 2024-04-22 NOTE — ASSESSMENT & PLAN NOTE
Diabetes is worsening.   Recommended an ADA diet.  Regular aerobic exercise.  Discussed ways to avoid symptomatic hypoglycemia.  Rx Farxiga 10mg daily and pt will f/u with her PCP  Diabetes will be reassessed in 1 month

## 2024-04-22 NOTE — ASSESSMENT & PLAN NOTE
Neg flu and covid testing    Added phenerganDM.  If worsening facial pain/pressure/purulent drainage she was given doxy to add-in but will try and hold off on antibiotics unless worsening

## 2024-04-22 NOTE — ASSESSMENT & PLAN NOTE
Diabetes is worsening.   Medication changes per orders.  Endocrinology clinic referral.  Diabetes will be reassessed in 6 months    Scheduling consult to est care with Endo    Continues on metformin, farxiga, and glucotrol.  Declines insulin adjustment.  Actos addition an option but would like to see Endo before adding meds.

## 2024-04-22 NOTE — PROGRESS NOTES
"Chief Complaint  Diabetes, Cough (Has been going on for 3 days), and Nasal Congestion    Subjective    History of Present Illness:  Jing Bojorquez is a 58 y.o. female who presents today for followup visit after establishing care in December 2023.    Last labs with elevated LFTs.  Liver ultrasound done Sept 2023 returned normal.     She has not been taking any insulin.  Declines injection options including once-weekly options.    Last A1c did show ongoing poor control at 11.9.  Has declined past recommendations for endocrinology consultation - discussed together limitations with treatment options given her A1c and refusal to take insulin.  She is now willing to see Endo.  Scheduling consultation with endo for further help with her DM management - which is poorly controlled.    No alcohol or other drugs.  No smoking    Urine microalb pos 12/20/23  Eye exam uptodate 6/2023  Foot exam uptodate 12/2023 with podiatry - they are restarting gabapentin.  She is also on elavil for neuropathy    Mammo uptodate at [facility]  Considering GYN/pelvic exam.     Congested cough with clear drainage since our last visit.  No fever.  Some facial pressure.          Objective   Vital Signs:   /70   Pulse 79   Ht 162.6 cm (64\")   Wt 56.1 kg (123 lb 11.2 oz)   SpO2 98%   BMI 21.23 kg/m²     Review of Systems   Constitutional:  Negative for appetite change, chills and fever.   HENT:  Positive for congestion and sinus pressure. Negative for hearing loss.    Eyes:  Negative for blurred vision.   Respiratory:  Positive for cough. Negative for chest tightness.    Cardiovascular:  Negative for chest pain.   Gastrointestinal:  Negative for abdominal pain.   Musculoskeletal:  Negative for gait problem.   Skin:  Negative for rash.   Psychiatric/Behavioral:  Negative for depressed mood.        Past History:  Medical History: has a past medical history of Allergic, Diabetes mellitus, and Hyperlipidemia.   Surgical History: has a past surgical " history that includes Appendectomy and Cholecystectomy.   Family History: family history includes Cancer in her mother; Diabetes in her father and mother; Hypertension in her father and mother.   Social History: reports that she has never smoked. She has been exposed to tobacco smoke. She has never used smokeless tobacco. She reports that she does not drink alcohol and does not use drugs.      Current Outpatient Medications:     amitriptyline (ELAVIL) 25 MG tablet, Take 1 tablet by mouth every night at bedtime., Disp: 90 tablet, Rfl: 1    aspirin 81 MG EC tablet, Take 1 tablet by mouth Daily., Disp: 90 tablet, Rfl: 1    atorvastatin (LIPITOR) 40 MG tablet, Take 1 tablet by mouth Daily. For heart protection and stroke prevention, Disp: 90 tablet, Rfl: 1    benazepril (LOTENSIN) 20 MG tablet, Take 1 tablet by mouth Daily., Disp: 90 tablet, Rfl: 1    Blood Glucose Monitoring Suppl (Blood Glucose Monitor System) w/Device kit, Check BG every morning before breakfast/eating  and as needed, Disp: 1 each, Rfl: 0    cetirizine (zyrTEC) 10 MG tablet, Take 1 tablet by mouth Daily., Disp: 90 tablet, Rfl: 1    dapagliflozin Propanediol (Farxiga) 10 MG tablet, Take 10 mg by mouth Daily., Disp: 90 tablet, Rfl: 1    glipizide (Glucotrol) 10 MG tablet, Take 2 tablets by mouth 2 (Two) Times a Day Before Meals., Disp: 360 tablet, Rfl: 1    metFORMIN ER (GLUCOPHAGE-XR) 500 MG 24 hr tablet, Take 2 tablets by mouth Daily With Breakfast & Dinner., Disp: 360 tablet, Rfl: 1    nystatin (MYCOSTATIN) 100,000 unit/mL suspension, Swish and swallow 5 mL 4 (Four) Times a Day., Disp: 473 mL, Rfl: 0    omeprazole (priLOSEC) 40 MG capsule, Take 1 capsule by mouth 2 (Two) Times a Day., Disp: 180 capsule, Rfl: 1    Qutenza, 4 Patch, 8 % kit topical system, Apply 8 patches topically to the appropriate area as directed Every 3 (Three) Months., Disp: , Rfl:     doxycycline (VIBRAMYCIN) 100 MG capsule, Take 1 capsule by mouth 2 (Two) Times a Day for 7  days. (Take 2 hrs after eating with full glass of water to prevent GI upset), Disp: 14 capsule, Rfl: 0    promethazine-dextromethorphan (PROMETHAZINE-DM) 6.25-15 MG/5ML syrup, Take 5 mL by mouth 4 (Four) Times a Day As Needed for Cough., Disp: 240 mL, Rfl: 3    Allergies: Cefaclor, Penicillins, Azithromycin, Morphine, Ciprofloxacin, and Metformin    Physical Exam  Constitutional:       Appearance: Normal appearance.   HENT:      Head: Normocephalic.      Right Ear: External ear normal.      Left Ear: External ear normal.      Nose: Nose normal. Congestion present.      Mouth/Throat:      Mouth: Mucous membranes are moist.      Pharynx: Oropharynx is clear.   Eyes:      Pupils: Pupils are equal, round, and reactive to light.   Cardiovascular:      Rate and Rhythm: Normal rate and regular rhythm.      Heart sounds: Normal heart sounds.   Pulmonary:      Effort: Pulmonary effort is normal.      Breath sounds: Normal breath sounds. No wheezing or rhonchi.   Musculoskeletal:         General: Normal range of motion.      Cervical back: Normal range of motion.   Skin:     General: Skin is warm and dry.   Neurological:      General: No focal deficit present.      Mental Status: She is alert.   Psychiatric:         Mood and Affect: Mood normal.         Behavior: Behavior normal.         Thought Content: Thought content normal.          Result Review                   Assessment and Plan  Diagnoses and all orders for this visit:    1. Type 2 diabetes mellitus with hyperglycemia, without long-term current use of insulin (Primary)  Assessment & Plan:  Diabetes is worsening.   Medication changes per orders.  Endocrinology clinic referral.  Diabetes will be reassessed in 6 months    Scheduling consult to [facility] care with Endo    Continues on metformin, farxiga, and glucotrol.  Declines insulin adjustment.  Actos addition an option but would like to see Endo before adding meds.     Orders:  -     aspirin 81 MG EC tablet; Take 1  tablet by mouth Daily.  Dispense: 90 tablet; Refill: 1  -     atorvastatin (LIPITOR) 40 MG tablet; Take 1 tablet by mouth Daily. For heart protection and stroke prevention  Dispense: 90 tablet; Refill: 1  -     dapagliflozin Propanediol (Farxiga) 10 MG tablet; Take 10 mg by mouth Daily.  Dispense: 90 tablet; Refill: 1  -     glipizide (Glucotrol) 10 MG tablet; Take 2 tablets by mouth 2 (Two) Times a Day Before Meals.  Dispense: 360 tablet; Refill: 1  -     metFORMIN ER (GLUCOPHAGE-XR) 500 MG 24 hr tablet; Take 2 tablets by mouth Daily With Breakfast & Dinner.  Dispense: 360 tablet; Refill: 1  -     Ambulatory Referral to Endocrinology    2. Type 2 diabetes mellitus with microalbuminuria, without long-term current use of insulin  Assessment & Plan:  Diabetes is worsening.   Medication changes per orders.  Endocrinology clinic referral.  Diabetes will be reassessed in 6 months    Scheduling consult to [facility] care with Endo    Continues on metformin, farxiga, and glucotrol.  Declines insulin adjustment.  Actos addition an option but would like to see Endo before adding meds.     Orders:  -     aspirin 81 MG EC tablet; Take 1 tablet by mouth Daily.  Dispense: 90 tablet; Refill: 1  -     atorvastatin (LIPITOR) 40 MG tablet; Take 1 tablet by mouth Daily. For heart protection and stroke prevention  Dispense: 90 tablet; Refill: 1  -     benazepril (LOTENSIN) 20 MG tablet; Take 1 tablet by mouth Daily.  Dispense: 90 tablet; Refill: 1  -     glipizide (Glucotrol) 10 MG tablet; Take 2 tablets by mouth 2 (Two) Times a Day Before Meals.  Dispense: 360 tablet; Refill: 1  -     metFORMIN ER (GLUCOPHAGE-XR) 500 MG 24 hr tablet; Take 2 tablets by mouth Daily With Breakfast & Dinner.  Dispense: 360 tablet; Refill: 1  -     Ambulatory Referral to Endocrinology    3. Type 2 diabetes mellitus with diabetic neuropathy, without long-term current use of insulin  Assessment & Plan:  Diabetes is worsening.   Medication changes per  orders.  Endocrinology clinic referral.  Diabetes will be reassessed in 6 months    Scheduling consult to [facility] care with Endo    Continues on metformin, farxiga, and glucotrol.  Declines insulin adjustment.  Actos addition an option but would like to see Endo before adding meds.     Orders:  -     amitriptyline (ELAVIL) 25 MG tablet; Take 1 tablet by mouth every night at bedtime.  Dispense: 90 tablet; Refill: 1  -     aspirin 81 MG EC tablet; Take 1 tablet by mouth Daily.  Dispense: 90 tablet; Refill: 1  -     atorvastatin (LIPITOR) 40 MG tablet; Take 1 tablet by mouth Daily. For heart protection and stroke prevention  Dispense: 90 tablet; Refill: 1  -     dapagliflozin Propanediol (Farxiga) 10 MG tablet; Take 10 mg by mouth Daily.  Dispense: 90 tablet; Refill: 1  -     glipizide (Glucotrol) 10 MG tablet; Take 2 tablets by mouth 2 (Two) Times a Day Before Meals.  Dispense: 360 tablet; Refill: 1  -     metFORMIN ER (GLUCOPHAGE-XR) 500 MG 24 hr tablet; Take 2 tablets by mouth Daily With Breakfast & Dinner.  Dispense: 360 tablet; Refill: 1  -     Ambulatory Referral to Endocrinology    4. Thrush  Assessment & Plan:  Resolved with nystatin    Needs better DM control    Endo referral being setup       5. Seasonal allergic rhinitis due to pollen  Assessment & Plan:  Added phenerganDM.  If worsening facial pain/pressure/purulent drainage she was given doxy to add-in but will try and hold off on antibiotics unless worsening    Orders:  -     cetirizine (zyrTEC) 10 MG tablet; Take 1 tablet by mouth Daily.  Dispense: 90 tablet; Refill: 1    6. Neuropathy  Assessment & Plan:  Cont elavil    Following now with podiatry.     Orders:  -     amitriptyline (ELAVIL) 25 MG tablet; Take 1 tablet by mouth every night at bedtime.  Dispense: 90 tablet; Refill: 1    7. HTN  Assessment & Plan:  Mild elevation today but did not sleep well with URI/cough.    No med changes but will recheck at followup    Orders:  -     benazepril (LOTENSIN)  20 MG tablet; Take 1 tablet by mouth Daily.  Dispense: 90 tablet; Refill: 1    8. Hyperlipidemia, mixed  Assessment & Plan:  Lipid abnormalities are improving with treatment.  Pharmacotherapy as ordered.  Lipids will be reassessed  4 mos .    Orders:  -     atorvastatin (LIPITOR) 40 MG tablet; Take 1 tablet by mouth Daily. For heart protection and stroke prevention  Dispense: 90 tablet; Refill: 1    9. GERD without esophagitis  Assessment & Plan:  Well controlled with omeprazole    Orders:  -     omeprazole (priLOSEC) 40 MG capsule; Take 1 capsule by mouth 2 (Two) Times a Day.  Dispense: 180 capsule; Refill: 1    10. Elevated liver function tests  Assessment & Plan:  Reviewed normal liver u/s    Declines hepatology referral    Neg testing for hep B and C.  Pos antibody indicating response to hep B vaccines    Declines labs today - will recheck at followup       11. Acute cough  Assessment & Plan:  Neg flu and covid testing    Added phenerganDM.  If worsening facial pain/pressure/purulent drainage she was given doxy to add-in but will try and hold off on antibiotics unless worsening    Orders:  -     Cancel: POCT SARS-CoV-2 Antigen SWEETIE + Flu  -     POCT SARS-CoV-2 Antigen SWEETIE  -     POC Influenza A / B  -     promethazine-dextromethorphan (PROMETHAZINE-DM) 6.25-15 MG/5ML syrup; Take 5 mL by mouth 4 (Four) Times a Day As Needed for Cough.  Dispense: 240 mL; Refill: 3    12. Acute non-recurrent maxillary sinusitis  Assessment & Plan:  Added phenerganDM.  If worsening facial pain/pressure/purulent drainage she was given doxy to add-in but will try and hold off on antibiotics unless worsening    Orders:  -     doxycycline (VIBRAMYCIN) 100 MG capsule; Take 1 capsule by mouth 2 (Two) Times a Day for 7 days. (Take 2 hrs after eating with full glass of water to prevent GI upset)  Dispense: 14 capsule; Refill: 0        BMI is within normal parameters. No other follow-up for BMI required.          Follow Up  Return in about 6  months (around 10/22/2024) for Med recheck, Fasting for labs at appointment (but drink water!).    Norbert Cardenas MD

## 2024-05-15 ENCOUNTER — TELEPHONE (OUTPATIENT)
Dept: FAMILY MEDICINE CLINIC | Facility: CLINIC | Age: 59
End: 2024-05-15
Payer: MEDICAID

## 2024-05-15 NOTE — TELEPHONE ENCOUNTER
[facility] said they received a DME fax that had been filled out incorrectly, they stated that they faxed it back and they are needing the provider to fill out section B of the DME form.  Fax# [phone]  Phone # [phone]

## 2024-08-01 ENCOUNTER — TELEPHONE (OUTPATIENT)
Dept: FAMILY MEDICINE CLINIC | Facility: CLINIC | Age: 59
End: 2024-08-01
Payer: MEDICAID

## 2024-08-01 DIAGNOSIS — E78.2 HYPERLIPIDEMIA, MIXED: Chronic | ICD-10-CM

## 2024-08-01 DIAGNOSIS — E11.65 TYPE 2 DIABETES MELLITUS WITH HYPERGLYCEMIA, WITHOUT LONG-TERM CURRENT USE OF INSULIN: ICD-10-CM

## 2024-08-01 DIAGNOSIS — R80.9 TYPE 2 DIABETES MELLITUS WITH MICROALBUMINURIA, WITHOUT LONG-TERM CURRENT USE OF INSULIN: Chronic | ICD-10-CM

## 2024-08-01 DIAGNOSIS — J30.1 SEASONAL ALLERGIC RHINITIS DUE TO POLLEN: Chronic | ICD-10-CM

## 2024-08-01 DIAGNOSIS — E11.40 TYPE 2 DIABETES MELLITUS WITH DIABETIC NEUROPATHY, WITHOUT LONG-TERM CURRENT USE OF INSULIN: ICD-10-CM

## 2024-08-01 DIAGNOSIS — E11.29 TYPE 2 DIABETES MELLITUS WITH MICROALBUMINURIA, WITHOUT LONG-TERM CURRENT USE OF INSULIN: Chronic | ICD-10-CM

## 2024-08-01 RX ORDER — ATORVASTATIN CALCIUM 40 MG/1
40 TABLET, FILM COATED ORAL DAILY
Qty: 90 TABLET | Refills: 1 | Status: SHIPPED | OUTPATIENT
Start: 2024-08-01

## 2024-08-01 RX ORDER — ONDANSETRON 4 MG/1
4 TABLET, FILM COATED ORAL EVERY 8 HOURS PRN
Qty: 30 TABLET | Refills: 3 | Status: SHIPPED | OUTPATIENT
Start: 2024-08-01

## 2024-08-01 RX ORDER — CETIRIZINE HYDROCHLORIDE 10 MG/1
10 TABLET ORAL DAILY
Qty: 90 TABLET | Refills: 1 | Status: SHIPPED | OUTPATIENT
Start: 2024-08-01

## 2024-08-01 NOTE — TELEPHONE ENCOUNTER
Caller: Jing Bojorquez    Relationship: Self    Best call back number: [phone]     Requested Prescriptions:   Requested Prescriptions     Pending Prescriptions Disp Refills    cetirizine (zyrTEC) 10 MG tablet 90 tablet 1     Sig: Take 1 tablet by mouth Daily.    atorvastatin (LIPITOR) 40 MG tablet 90 tablet 1     Sig: Take 1 tablet by mouth Daily. For heart protection and stroke prevention        Pharmacy where request should be sent: [address] [phone] [facility] [phone]      Last office visit with prescribing clinician: 4/22/2024   Last telemedicine visit with prescribing clinician: 4/5/2024   Next office visit with prescribing clinician: 10/22/2024     Additional details provided by patient: OUT OF MEDICATION     Does the patient have less than a 3 day supply:  [x] Yes  [] No    Would you like a call back once the refill request has been completed: [] Yes [x] No    If the office needs to give you a call back, can they leave a voicemail: [] Yes [x] No    Radha Saul Rep   08/01/24 11:33 EDT

## 2024-08-01 NOTE — TELEPHONE ENCOUNTER
Caller: Jing Bojorquez    Relationship: Self    Best call back number: [phone]     What medication are you requesting: ondansetron (ZOFRAN) 4 MG tablet       What are your current symptoms: NAUSEA     How long have you been experiencing symptoms: ON AND OFF FOR 4 OR 5 YEARS     Have you had these symptoms before:    [x] Yes  [] No    Have you been treated for these symptoms before:   [x] Yes  [] No    If a prescription is needed, what is your preferred pharmacy and phone number: [facility] [address] [place] [address] [phone] [facility] [phone] FX     Additional notes:THE PATIENT REPORTS SHE IS OUT MEDICATION AND IS REQUESTING A NEW PRESCRIPTION TO BE SENT TO [facility] (COULD NOT QUEUE UP AS MED REFILL BECAUSE NOT ON CURRENT MED LIST).

## 2024-08-05 DIAGNOSIS — K21.9 GERD WITHOUT ESOPHAGITIS: Chronic | ICD-10-CM

## 2024-08-05 RX ORDER — OMEPRAZOLE 40 MG/1
40 CAPSULE, DELAYED RELEASE ORAL 2 TIMES DAILY
Qty: 180 CAPSULE | Refills: 1 | Status: SHIPPED | OUTPATIENT
Start: 2024-08-05

## 2024-08-05 NOTE — TELEPHONE ENCOUNTER
Caller: Maryellen Jing    Relationship: Self    Best call back number: [phone]    Requested Prescriptions:   Requested Prescriptions     Pending Prescriptions Disp Refills    omeprazole (priLOSEC) 40 MG capsule 180 capsule 1     Sig: Take 1 capsule by mouth 2 (Two) Times a Day.        Pharmacy where request should be sent: [facility] [address] [place] [address] [phone] [facility] [phone] FX     Last office visit with prescribing clinician: 4/22/2024   Last telemedicine visit with prescribing clinician: 4/5/2024   Next office visit with prescribing clinician: 10/22/2024     Additional details provided by patient:       Radha Gomes   08/05/24 12:24 EDT

## 2024-08-12 ENCOUNTER — TELEPHONE (OUTPATIENT)
Dept: FAMILY MEDICINE CLINIC | Facility: CLINIC | Age: 59
End: 2024-08-12

## 2024-08-12 NOTE — TELEPHONE ENCOUNTER
Caller: Jing Bojorquez    Relationship: Self    Best call back number: [phone]     What form or medical record are you requesting: PRIOR AUTHORIZATION     Who is requesting this form or medical record from you: INSURANCE      Timeframe paperwork needed: AS SOON AS POSSIBLE    Additional notes: PATIENT NEEDS PRIOR AUTHORIZATION FOR omeprazole (priLOSEC) 40 MG capsule

## 2024-08-28 ENCOUNTER — TELEPHONE (OUTPATIENT)
Dept: FAMILY MEDICINE CLINIC | Facility: CLINIC | Age: 59
End: 2024-08-28
Payer: MEDICAID

## 2024-08-28 NOTE — TELEPHONE ENCOUNTER
PATIENT CALLED AND STATED THAT ACCORDING TO HER INSURANCE TO BE ABLE TO GET HER OMEPRAZOLE IT WILL NEED TO HAVE A PA WITH MORE DETAILS.

## 2024-10-22 ENCOUNTER — OFFICE VISIT (OUTPATIENT)
Dept: FAMILY MEDICINE CLINIC | Facility: CLINIC | Age: 59
End: 2024-10-22
Payer: MEDICAID

## 2024-10-22 ENCOUNTER — TELEPHONE (OUTPATIENT)
Dept: FAMILY MEDICINE CLINIC | Facility: CLINIC | Age: 59
End: 2024-10-22

## 2024-10-22 VITALS
HEART RATE: 83 BPM | DIASTOLIC BLOOD PRESSURE: 60 MMHG | WEIGHT: 137 LBS | SYSTOLIC BLOOD PRESSURE: 110 MMHG | OXYGEN SATURATION: 98 % | BODY MASS INDEX: 23.39 KG/M2 | HEIGHT: 64 IN

## 2024-10-22 DIAGNOSIS — R80.9 TYPE 2 DIABETES MELLITUS WITH MICROALBUMINURIA, WITHOUT LONG-TERM CURRENT USE OF INSULIN: Chronic | ICD-10-CM

## 2024-10-22 DIAGNOSIS — K21.9 GERD WITHOUT ESOPHAGITIS: Chronic | ICD-10-CM

## 2024-10-22 DIAGNOSIS — E55.9 VITAMIN D DEFICIENCY: ICD-10-CM

## 2024-10-22 DIAGNOSIS — R79.89 ELEVATED LIVER FUNCTION TESTS: Chronic | ICD-10-CM

## 2024-10-22 DIAGNOSIS — G62.9 NEUROPATHY: Chronic | ICD-10-CM

## 2024-10-22 DIAGNOSIS — I10 HYPERTENSION, ESSENTIAL: Chronic | ICD-10-CM

## 2024-10-22 DIAGNOSIS — Z00.00 GENERAL MEDICAL EXAM: Primary | ICD-10-CM

## 2024-10-22 DIAGNOSIS — J30.1 SEASONAL ALLERGIC RHINITIS DUE TO POLLEN: Chronic | ICD-10-CM

## 2024-10-22 DIAGNOSIS — E11.65 TYPE 2 DIABETES MELLITUS WITH HYPERGLYCEMIA, WITHOUT LONG-TERM CURRENT USE OF INSULIN: ICD-10-CM

## 2024-10-22 DIAGNOSIS — E11.40 TYPE 2 DIABETES MELLITUS WITH DIABETIC NEUROPATHY, WITHOUT LONG-TERM CURRENT USE OF INSULIN: ICD-10-CM

## 2024-10-22 DIAGNOSIS — D64.9 MILD ANEMIA: ICD-10-CM

## 2024-10-22 DIAGNOSIS — E11.29 TYPE 2 DIABETES MELLITUS WITH MICROALBUMINURIA, WITHOUT LONG-TERM CURRENT USE OF INSULIN: Chronic | ICD-10-CM

## 2024-10-22 DIAGNOSIS — L30.8 OTHER ECZEMA: ICD-10-CM

## 2024-10-22 DIAGNOSIS — E78.2 HYPERLIPIDEMIA, MIXED: Chronic | ICD-10-CM

## 2024-10-22 PROBLEM — J01.00 ACUTE NON-RECURRENT MAXILLARY SINUSITIS: Status: RESOLVED | Noted: 2024-04-06 | Resolved: 2024-10-22

## 2024-10-22 PROBLEM — B37.0 THRUSH: Status: RESOLVED | Noted: 2024-03-29 | Resolved: 2024-10-22

## 2024-10-22 PROBLEM — R05.1 ACUTE COUGH: Status: RESOLVED | Noted: 2024-03-09 | Resolved: 2024-10-22

## 2024-10-22 LAB
EXPIRATION DATE: ABNORMAL
HBA1C MFR BLD: 9 % (ref 4.5–5.7)
Lab: ABNORMAL

## 2024-10-22 PROCEDURE — 3074F SYST BP LT 130 MM HG: CPT | Performed by: FAMILY MEDICINE

## 2024-10-22 PROCEDURE — 3078F DIAST BP <80 MM HG: CPT | Performed by: FAMILY MEDICINE

## 2024-10-22 PROCEDURE — 3052F HG A1C>EQUAL 8.0%<EQUAL 9.0%: CPT | Performed by: FAMILY MEDICINE

## 2024-10-22 PROCEDURE — 83036 HEMOGLOBIN GLYCOSYLATED A1C: CPT | Performed by: FAMILY MEDICINE

## 2024-10-22 PROCEDURE — 99396 PREV VISIT EST AGE 40-64: CPT | Performed by: FAMILY MEDICINE

## 2024-10-22 PROCEDURE — 1160F RVW MEDS BY RX/DR IN RCRD: CPT | Performed by: FAMILY MEDICINE

## 2024-10-22 PROCEDURE — 1159F MED LIST DOCD IN RCRD: CPT | Performed by: FAMILY MEDICINE

## 2024-10-22 PROCEDURE — 1125F AMNT PAIN NOTED PAIN PRSNT: CPT | Performed by: FAMILY MEDICINE

## 2024-10-22 RX ORDER — BENAZEPRIL HYDROCHLORIDE 20 MG/1
20 TABLET ORAL DAILY
Qty: 90 TABLET | Refills: 1 | Status: SHIPPED | OUTPATIENT
Start: 2024-10-22

## 2024-10-22 RX ORDER — ONDANSETRON 4 MG/1
4 TABLET, FILM COATED ORAL EVERY 8 HOURS PRN
Qty: 30 TABLET | Refills: 3 | Status: SHIPPED | OUTPATIENT
Start: 2024-10-22

## 2024-10-22 RX ORDER — MOMETASONE FUROATE 1 MG/G
1 CREAM TOPICAL DAILY PRN
Qty: 45 G | Refills: 5 | Status: SHIPPED | OUTPATIENT
Start: 2024-10-22

## 2024-10-22 RX ORDER — PIOGLITAZONEHYDROCHLORIDE 15 MG/1
15 TABLET ORAL DAILY
Qty: 90 TABLET | Refills: 1 | Status: SHIPPED | OUTPATIENT
Start: 2024-10-22

## 2024-10-22 RX ORDER — ASPIRIN 81 MG/1
81 TABLET ORAL DAILY
Qty: 90 TABLET | Refills: 1 | Status: SHIPPED | OUTPATIENT
Start: 2024-10-22

## 2024-10-22 RX ORDER — GLIPIZIDE 10 MG/1
20 TABLET ORAL
Qty: 360 TABLET | Refills: 1 | Status: SHIPPED | OUTPATIENT
Start: 2024-10-22

## 2024-10-22 RX ORDER — ATORVASTATIN CALCIUM 40 MG/1
40 TABLET, FILM COATED ORAL DAILY
Qty: 90 TABLET | Refills: 1 | Status: SHIPPED | OUTPATIENT
Start: 2024-10-22

## 2024-10-22 RX ORDER — OMEPRAZOLE 40 MG/1
40 CAPSULE, DELAYED RELEASE ORAL 2 TIMES DAILY
Qty: 180 CAPSULE | Refills: 1 | Status: SHIPPED | OUTPATIENT
Start: 2024-10-22

## 2024-10-22 RX ORDER — DAPAGLIFLOZIN 10 MG/1
10 TABLET, FILM COATED ORAL DAILY
Qty: 90 TABLET | Refills: 1 | Status: SHIPPED | OUTPATIENT
Start: 2024-10-22

## 2024-10-22 RX ORDER — METFORMIN HCL 500 MG
1000 TABLET, EXTENDED RELEASE 24 HR ORAL
Qty: 360 TABLET | Refills: 1 | Status: SHIPPED | OUTPATIENT
Start: 2024-10-22

## 2024-10-22 NOTE — PROGRESS NOTES
"Chief Complaint  Physical     Subjective    History of Present Illness:  Jing Bojorquez is a 59 y.o. female who presents today for physical exam and followup regarding DM, Hyperlipidemia, Neuropathy, HTN, GERD, and Elevated LFTs.    Doing well since last visit in April together.    Past labs with elevated LFTs.  Liver ultrasound done Sept 2023 returned normal.     She has not been taking any insulin.  Declines injection options including once-weekly options.    Last A1c did show ongoing poor control at 11.9.  A1c down to 9.0 today but still above goal.  We did discuss medication options and she is willing to add in Actos.  We did set up endocrinology consult at her last visit but she ended up canceling this and declines endocrinology referral.    Discussed past due mammogram-patient declines.    Discussed colon cancer screening and she reports this was up-to-date with EGD with GI at [facility].  Requested report for chart update.    Encouraged past-due diabetic eye exam.    No alcohol or other drugs.  No smoking    Urine microalb pos 12/20/23  Eye exam due.  Encouraged past-due eye exam  Foot exam uptodate 12/2023 with podiatry - they are restarting gabapentin.  She is also on elavil for neuropathy      Objective   Vital Signs:   /60   Pulse 83   Ht 162.6 cm (64\")   Wt 62.1 kg (137 lb)   SpO2 98%   BMI 23.52 kg/m²     Review of Systems   Constitutional:  Negative for appetite change, chills and fever.   HENT:  Negative for hearing loss.    Eyes:  Negative for blurred vision.   Respiratory:  Negative for chest tightness.    Cardiovascular:  Negative for chest pain.   Gastrointestinal:  Negative for abdominal pain.   Musculoskeletal:  Negative for gait problem.   Skin:  Positive for rash.   Psychiatric/Behavioral:  Negative for depressed mood.        Past History:  Medical History: has a past medical history of Allergic, Diabetes mellitus, and Hyperlipidemia.   Surgical History: has a " past surgical history that includes Appendectomy and Cholecystectomy.   Family History: family history includes Cancer in her mother; Diabetes in her father and mother; Hypertension in her father and mother.   Social History: reports that she has never smoked. She has been exposed to tobacco smoke. She has never used smokeless tobacco. She reports that she does not drink alcohol and does not use drugs.      Current Outpatient Medications:     amitriptyline (ELAVIL) 25 MG tablet, Take 1 tablet by mouth every night at bedtime., Disp: 90 tablet, Rfl: 1    aspirin 81 MG EC tablet, Take 1 tablet by mouth Daily., Disp: 90 tablet, Rfl: 1    atorvastatin (LIPITOR) 40 MG tablet, Take 1 tablet by mouth Daily. For heart protection and stroke prevention, Disp: 90 tablet, Rfl: 1    benazepril (LOTENSIN) 20 MG tablet, Take 1 tablet by mouth Daily., Disp: 90 tablet, Rfl: 1    Blood Glucose Monitoring Suppl (Blood Glucose Monitor System) w/Device kit, Check BG every morning before breakfast/eating  and as needed, Disp: 1 each, Rfl: 0    cetirizine (zyrTEC) 10 MG tablet, Take 1 tablet by mouth Daily., Disp: 90 tablet, Rfl: 1    dapagliflozin Propanediol (Farxiga) 10 MG tablet, Take 10 mg by mouth Daily., Disp: 90 tablet, Rfl: 1    glipizide (Glucotrol) 10 MG tablet, Take 2 tablets by mouth 2 (Two) Times a Day Before Meals., Disp: 360 tablet, Rfl: 1    metFORMIN ER (GLUCOPHAGE-XR) 500 MG 24 hr tablet, Take 2 tablets by mouth Daily With Breakfast & Dinner., Disp: 360 tablet, Rfl: 1    omeprazole (priLOSEC) 40 MG capsule, Take 1 capsule by mouth 2 (Two) Times a Day., Disp: 180 capsule, Rfl: 1    ondansetron (Zofran) 4 MG tablet, Take 1 tablet by mouth Every 8 (Eight) Hours As Needed for Nausea or Vomiting., Disp: 30 tablet, Rfl: 3    Qutenza, 4 Patch, 8 % kit topical system, Apply 8 patches topically to the appropriate area as directed Every 3 (Three) Months., Disp: , Rfl:     mometasone (ELOCON) 0.1 % cream, Apply 1 Application  topically to the appropriate area as directed Daily As Needed (rash/eczema)., Disp: 45 g, Rfl: 5    pioglitazone (Actos) 15 MG tablet, Take 1 tablet by mouth Daily. For diabetes, Disp: 90 tablet, Rfl: 1    Allergies: Cefaclor, Penicillins, Azithromycin, Morphine, Ciprofloxacin, and Metformin    Physical Exam  Constitutional:       Appearance: Normal appearance.   HENT:      Head: Normocephalic.      Right Ear: External ear normal.      Left Ear: External ear normal.      Nose: Nose normal.   Eyes:      Pupils: Pupils are equal, round, and reactive to light.   Cardiovascular:      Rate and Rhythm: Normal rate and regular rhythm.      Heart sounds: Normal heart sounds.   Pulmonary:      Effort: Pulmonary effort is normal.      Breath sounds: Normal breath sounds.   Musculoskeletal:         General: Normal range of motion.      Cervical back: Normal range of motion.   Skin:     Comments: Left lateral thigh with small 4 cm patch of eczema.  No evidence for infection   Neurological:      General: No focal deficit present.      Mental Status: She is alert.   Psychiatric:         Mood and Affect: Mood normal.         Behavior: Behavior normal.         Thought Content: Thought content normal.          Result Review                   Assessment and Plan  Diagnoses and all orders for this visit:    1. General medical exam (Primary)  Assessment & Plan:  Discussed together health maintenance and screening along with vaccination options and healthy diet and exercise habits as part of the preventative counseling at their physical exam today.       2. Type 2 diabetes mellitus with hyperglycemia, without long-term current use of insulin  Assessment & Plan:  Diabetes is improving with treatment.   Medication changes per orders.  Recommended an ADA diet.  Regular aerobic exercise.  Reminded to get yearly retinal exam.  Diabetes will be reassessed in 6 months    Added Actos.  Discussed diet and exercise efforts.  Recheck in 4 to 6  months or sooner problems arise    Orders:  -     POCT glycated hemoglobin, total  -     CBC & Differential; Future  -     Comprehensive Metabolic Panel; Future  -     Lipid Panel; Future  -     TSH; Future  -     T4, Free; Future  -     aspirin 81 MG EC tablet; Take 1 tablet by mouth Daily.  Dispense: 90 tablet; Refill: 1  -     atorvastatin (LIPITOR) 40 MG tablet; Take 1 tablet by mouth Daily. For heart protection and stroke prevention  Dispense: 90 tablet; Refill: 1  -     dapagliflozin Propanediol (Farxiga) 10 MG tablet; Take 10 mg by mouth Daily.  Dispense: 90 tablet; Refill: 1  -     glipizide (Glucotrol) 10 MG tablet; Take 2 tablets by mouth 2 (Two) Times a Day Before Meals.  Dispense: 360 tablet; Refill: 1  -     metFORMIN ER (GLUCOPHAGE-XR) 500 MG 24 hr tablet; Take 2 tablets by mouth Daily With Breakfast & Dinner.  Dispense: 360 tablet; Refill: 1  -     pioglitazone (Actos) 15 MG tablet; Take 1 tablet by mouth Daily. For diabetes  Dispense: 90 tablet; Refill: 1  -     T4, Free  -     TSH  -     Lipid Panel  -     Comprehensive Metabolic Panel  -     CBC & Differential    3. Type 2 diabetes mellitus with microalbuminuria, without long-term current use of insulin  Assessment & Plan:  See above    Orders:  -     POCT glycated hemoglobin, total  -     CBC & Differential; Future  -     Comprehensive Metabolic Panel; Future  -     Lipid Panel; Future  -     TSH; Future  -     T4, Free; Future  -     aspirin 81 MG EC tablet; Take 1 tablet by mouth Daily.  Dispense: 90 tablet; Refill: 1  -     atorvastatin (LIPITOR) 40 MG tablet; Take 1 tablet by mouth Daily. For heart protection and stroke prevention  Dispense: 90 tablet; Refill: 1  -     benazepril (LOTENSIN) 20 MG tablet; Take 1 tablet by mouth Daily.  Dispense: 90 tablet; Refill: 1  -     glipizide (Glucotrol) 10 MG tablet; Take 2 tablets by mouth 2 (Two) Times a Day Before Meals.  Dispense: 360 tablet; Refill: 1  -     metFORMIN ER (GLUCOPHAGE-XR) 500 MG 24  hr tablet; Take 2 tablets by mouth Daily With Breakfast & Dinner.  Dispense: 360 tablet; Refill: 1  -     pioglitazone (Actos) 15 MG tablet; Take 1 tablet by mouth Daily. For diabetes  Dispense: 90 tablet; Refill: 1  -     T4, Free  -     TSH  -     Lipid Panel  -     Comprehensive Metabolic Panel  -     CBC & Differential    4. Type 2 diabetes mellitus with diabetic neuropathy, without long-term current use of insulin  Assessment & Plan:  See above.  Ongoing follow-up with pain management for diabetic neuropathy    Orders:  -     POCT glycated hemoglobin, total  -     CBC & Differential; Future  -     aspirin 81 MG EC tablet; Take 1 tablet by mouth Daily.  Dispense: 90 tablet; Refill: 1  -     atorvastatin (LIPITOR) 40 MG tablet; Take 1 tablet by mouth Daily. For heart protection and stroke prevention  Dispense: 90 tablet; Refill: 1  -     amitriptyline (ELAVIL) 25 MG tablet; Take 1 tablet by mouth every night at bedtime.  Dispense: 90 tablet; Refill: 1  -     dapagliflozin Propanediol (Farxiga) 10 MG tablet; Take 10 mg by mouth Daily.  Dispense: 90 tablet; Refill: 1  -     glipizide (Glucotrol) 10 MG tablet; Take 2 tablets by mouth 2 (Two) Times a Day Before Meals.  Dispense: 360 tablet; Refill: 1  -     metFORMIN ER (GLUCOPHAGE-XR) 500 MG 24 hr tablet; Take 2 tablets by mouth Daily With Breakfast & Dinner.  Dispense: 360 tablet; Refill: 1  -     pioglitazone (Actos) 15 MG tablet; Take 1 tablet by mouth Daily. For diabetes  Dispense: 90 tablet; Refill: 1  -     CBC & Differential    5. Neuropathy  Assessment & Plan:  Cont elavil    Following now with podiatry.     Orders:  -     Vitamin D,25-Hydroxy; Future  -     amitriptyline (ELAVIL) 25 MG tablet; Take 1 tablet by mouth every night at bedtime.  Dispense: 90 tablet; Refill: 1  -     Vitamin D,25-Hydroxy    6. HTN  Assessment & Plan:  Hypertension is stable and controlled  Continue current treatment regimen.  Weight loss.  Regular aerobic exercise.  Blood  pressure will be reassessed in 6 months.    Orders:  -     CBC & Differential; Future  -     Comprehensive Metabolic Panel; Future  -     Lipid Panel; Future  -     TSH; Future  -     T4, Free; Future  -     benazepril (LOTENSIN) 20 MG tablet; Take 1 tablet by mouth Daily.  Dispense: 90 tablet; Refill: 1  -     T4, Free  -     TSH  -     Lipid Panel  -     Comprehensive Metabolic Panel  -     CBC & Differential    7. Hyperlipidemia, mixed  Assessment & Plan:  Continue atorvastatin.  Awaiting recheck on fasting lab    Orders:  -     CBC & Differential; Future  -     Comprehensive Metabolic Panel; Future  -     Lipid Panel; Future  -     TSH; Future  -     T4, Free; Future  -     atorvastatin (LIPITOR) 40 MG tablet; Take 1 tablet by mouth Daily. For heart protection and stroke prevention  Dispense: 90 tablet; Refill: 1  -     T4, Free  -     TSH  -     Lipid Panel  -     Comprehensive Metabolic Panel  -     CBC & Differential    8. Elevated liver function tests  Assessment & Plan:  Reviewed normal liver u/s    Declines hepatology referral    Neg testing for hep B and C.  Pos antibody indicating response to hep B vaccines    Orders:  -     Comprehensive Metabolic Panel; Future  -     Lipid Panel; Future  -     Lipid Panel  -     Comprehensive Metabolic Panel    9. GERD without esophagitis  Assessment & Plan:  Well controlled with omeprazole    Requested EGD report from gastroenterology along with colonoscopy report    Orders:  -     omeprazole (priLOSEC) 40 MG capsule; Take 1 capsule by mouth 2 (Two) Times a Day.  Dispense: 180 capsule; Refill: 1    10. Seasonal allergic rhinitis due to pollen  Assessment & Plan:  Doing well with current regimen      11. Mild anemia  -     CBC & Differential; Future  -     Ferritin; Future  -     Iron Profile; Future  -     Folate; Future  -     Vitamin B12; Future  -     Vitamin B12  -     Folate  -     Iron Profile  -     Ferritin  -     CBC & Differential    12. Vitamin D  deficiency  -     Vitamin D,25-Hydroxy; Future  -     Vitamin D,25-Hydroxy    13. Other eczema  -     mometasone (ELOCON) 0.1 % cream; Apply 1 Application topically to the appropriate area as directed Daily As Needed (rash/eczema).  Dispense: 45 g; Refill: 5    Other orders  -     ondansetron (Zofran) 4 MG tablet; Take 1 tablet by mouth Every 8 (Eight) Hours As Needed for Nausea or Vomiting.  Dispense: 30 tablet; Refill: 3        BMI is within normal parameters. No other follow-up for BMI required.          Follow Up  Return in about 6 months (around 4/22/2025) for Med recheck.    Norbert Cardenas MD

## 2024-10-22 NOTE — TELEPHONE ENCOUNTER
Could we try and get her egd/colonoscopy from Dr Monroy office in the past year or so?  Thanks   1 min  MB  REQUESTED

## 2024-10-22 NOTE — ASSESSMENT & PLAN NOTE
Well controlled with omeprazole    Requested EGD report from gastroenterology along with colonoscopy report

## 2024-10-22 NOTE — ASSESSMENT & PLAN NOTE
Diabetes is improving with treatment.   Medication changes per orders.  Recommended an ADA diet.  Regular aerobic exercise.  Reminded to get yearly retinal exam.  Diabetes will be reassessed in 6 months    Added Actos.  Discussed diet and exercise efforts.  Recheck in 4 to 6 months or sooner problems arise

## 2024-10-22 NOTE — ASSESSMENT & PLAN NOTE
Reviewed normal liver u/s    Declines hepatology referral    Neg testing for hep B and C.  Pos antibody indicating response to hep B vaccines

## 2024-10-23 LAB
25(OH)D3+25(OH)D2 SERPL-MCNC: 32.5 NG/ML (ref 30–100)
ALBUMIN SERPL-MCNC: 4.3 G/DL (ref 3.8–4.9)
ALP SERPL-CCNC: 148 IU/L (ref 44–121)
ALT SERPL-CCNC: 21 IU/L (ref 0–32)
AST SERPL-CCNC: 20 IU/L (ref 0–40)
BASOPHILS # BLD AUTO: 0.1 X10E3/UL (ref 0–0.2)
BASOPHILS NFR BLD AUTO: 1 %
BILIRUB SERPL-MCNC: <0.2 MG/DL (ref 0–1.2)
BUN SERPL-MCNC: 13 MG/DL (ref 6–24)
BUN/CREAT SERPL: 18 (ref 9–23)
CALCIUM SERPL-MCNC: 9.2 MG/DL (ref 8.7–10.2)
CHLORIDE SERPL-SCNC: 100 MMOL/L (ref 96–106)
CHOLEST SERPL-MCNC: 106 MG/DL (ref 100–199)
CO2 SERPL-SCNC: 20 MMOL/L (ref 20–29)
CREAT SERPL-MCNC: 0.71 MG/DL (ref 0.57–1)
EGFRCR SERPLBLD CKD-EPI 2021: 99 ML/MIN/1.73
EOSINOPHIL # BLD AUTO: 0.2 X10E3/UL (ref 0–0.4)
EOSINOPHIL NFR BLD AUTO: 2 %
ERYTHROCYTE [DISTWIDTH] IN BLOOD BY AUTOMATED COUNT: 12.7 % (ref 11.7–15.4)
FERRITIN SERPL-MCNC: 26 NG/ML (ref 15–150)
FOLATE SERPL-MCNC: 17.1 NG/ML
GLOBULIN SER CALC-MCNC: 2.9 G/DL (ref 1.5–4.5)
GLUCOSE SERPL-MCNC: 275 MG/DL (ref 70–99)
HCT VFR BLD AUTO: 40.6 % (ref 34–46.6)
HDLC SERPL-MCNC: 41 MG/DL
HGB BLD-MCNC: 12.4 G/DL (ref 11.1–15.9)
IMM GRANULOCYTES # BLD AUTO: 0 X10E3/UL (ref 0–0.1)
IMM GRANULOCYTES NFR BLD AUTO: 0 %
IRON SATN MFR SERPL: 14 % (ref 15–55)
IRON SERPL-MCNC: 42 UG/DL (ref 27–159)
LDLC SERPL CALC-MCNC: 38 MG/DL (ref 0–99)
LYMPHOCYTES # BLD AUTO: 1.8 X10E3/UL (ref 0.7–3.1)
LYMPHOCYTES NFR BLD AUTO: 28 %
MCH RBC QN AUTO: 25.9 PG (ref 26.6–33)
MCHC RBC AUTO-ENTMCNC: 30.5 G/DL (ref 31.5–35.7)
MCV RBC AUTO: 85 FL (ref 79–97)
MONOCYTES # BLD AUTO: 0.5 X10E3/UL (ref 0.1–0.9)
MONOCYTES NFR BLD AUTO: 8 %
NEUTROPHILS # BLD AUTO: 4 X10E3/UL (ref 1.4–7)
NEUTROPHILS NFR BLD AUTO: 61 %
PLATELET # BLD AUTO: 255 X10E3/UL (ref 150–450)
POTASSIUM SERPL-SCNC: 4.9 MMOL/L (ref 3.5–5.2)
PROT SERPL-MCNC: 7.2 G/DL (ref 6–8.5)
RBC # BLD AUTO: 4.79 X10E6/UL (ref 3.77–5.28)
SODIUM SERPL-SCNC: 137 MMOL/L (ref 134–144)
T4 FREE SERPL-MCNC: 1.16 NG/DL (ref 0.82–1.77)
TIBC SERPL-MCNC: 309 UG/DL (ref 250–450)
TRIGL SERPL-MCNC: 161 MG/DL (ref 0–149)
TSH SERPL DL<=0.005 MIU/L-ACNC: 2.09 UIU/ML (ref 0.45–4.5)
UIBC SERPL-MCNC: 267 UG/DL (ref 131–425)
VIT B12 SERPL-MCNC: 502 PG/ML (ref 232–1245)
VLDLC SERPL CALC-MCNC: 27 MG/DL (ref 5–40)
WBC # BLD AUTO: 6.6 X10E3/UL (ref 3.4–10.8)

## 2024-10-28 NOTE — PROGRESS NOTES
The message below may be given by the hub:    Please contact patient with [facility] lab result message.    There is a lab result message attached to their lab results... but I received notification that the results were not viewed within 5 days on [facility].  I need to make sure that they get their lab result message.    Thank you.

## 2024-11-26 ENCOUNTER — TELEPHONE (OUTPATIENT)
Dept: FAMILY MEDICINE CLINIC | Facility: CLINIC | Age: 59
End: 2024-11-26
Payer: MEDICAID

## 2024-12-02 DIAGNOSIS — R80.9 TYPE 2 DIABETES MELLITUS WITH MICROALBUMINURIA, WITHOUT LONG-TERM CURRENT USE OF INSULIN: Chronic | ICD-10-CM

## 2024-12-02 DIAGNOSIS — E11.40 TYPE 2 DIABETES MELLITUS WITH DIABETIC NEUROPATHY, WITHOUT LONG-TERM CURRENT USE OF INSULIN: ICD-10-CM

## 2024-12-02 DIAGNOSIS — E11.29 TYPE 2 DIABETES MELLITUS WITH MICROALBUMINURIA, WITHOUT LONG-TERM CURRENT USE OF INSULIN: Chronic | ICD-10-CM

## 2024-12-02 DIAGNOSIS — E11.65 TYPE 2 DIABETES MELLITUS WITH HYPERGLYCEMIA, WITHOUT LONG-TERM CURRENT USE OF INSULIN: ICD-10-CM

## 2024-12-02 RX ORDER — METFORMIN HYDROCHLORIDE 500 MG/1
1000 TABLET, EXTENDED RELEASE ORAL
Qty: 360 TABLET | Refills: 1 | Status: SHIPPED | OUTPATIENT
Start: 2024-12-02

## 2024-12-02 NOTE — TELEPHONE ENCOUNTER
Caller: Shinsophia Jing    Relationship: Self    Best call back number: [phone]     Requested Prescriptions:   Requested Prescriptions     Pending Prescriptions Disp Refills    metFORMIN ER (GLUCOPHAGE-XR) 500 MG 24 hr tablet 360 tablet 1     Sig: Take 2 tablets by mouth Daily With Breakfast & Dinner.        Pharmacy where request should be sent: [facility] [address] [place] [address] [phone] [facility] [phone] [facility]     Last office visit with prescribing clinician: 10/22/2024   Last telemedicine visit with prescribing clinician: Visit date not found   Next office visit with prescribing clinician: 4/22/2025     Additional details provided by patient: PATIENT IS OUT OF MEDICATION

## 2024-12-14 ENCOUNTER — OFFICE VISIT (OUTPATIENT)
Dept: FAMILY MEDICINE CLINIC | Facility: CLINIC | Age: 59
End: 2024-12-14
Payer: MEDICAID

## 2024-12-14 VITALS
HEIGHT: 65 IN | DIASTOLIC BLOOD PRESSURE: 84 MMHG | SYSTOLIC BLOOD PRESSURE: 132 MMHG | HEART RATE: 98 BPM | WEIGHT: 132 LBS | OXYGEN SATURATION: 98 % | BODY MASS INDEX: 21.99 KG/M2

## 2024-12-14 DIAGNOSIS — J01.10 ACUTE FRONTAL SINUSITIS, RECURRENCE NOT SPECIFIED: Primary | ICD-10-CM

## 2024-12-14 PROCEDURE — 1125F AMNT PAIN NOTED PAIN PRSNT: CPT | Performed by: PHYSICIAN ASSISTANT

## 2024-12-14 PROCEDURE — 3075F SYST BP GE 130 - 139MM HG: CPT | Performed by: PHYSICIAN ASSISTANT

## 2024-12-14 PROCEDURE — 99213 OFFICE O/P EST LOW 20 MIN: CPT | Performed by: PHYSICIAN ASSISTANT

## 2024-12-14 PROCEDURE — 3052F HG A1C>EQUAL 8.0%<EQUAL 9.0%: CPT | Performed by: PHYSICIAN ASSISTANT

## 2024-12-14 PROCEDURE — 1160F RVW MEDS BY RX/DR IN RCRD: CPT | Performed by: PHYSICIAN ASSISTANT

## 2024-12-14 PROCEDURE — 1159F MED LIST DOCD IN RCRD: CPT | Performed by: PHYSICIAN ASSISTANT

## 2024-12-14 PROCEDURE — 3079F DIAST BP 80-89 MM HG: CPT | Performed by: PHYSICIAN ASSISTANT

## 2024-12-14 PROCEDURE — 87428 SARSCOV & INF VIR A&B AG IA: CPT | Performed by: PHYSICIAN ASSISTANT

## 2024-12-14 RX ORDER — DOXYCYCLINE 100 MG/1
100 CAPSULE ORAL 2 TIMES DAILY
Qty: 20 CAPSULE | Refills: 0 | Status: SHIPPED | OUTPATIENT
Start: 2024-12-14

## 2024-12-14 NOTE — PROGRESS NOTES
Office Note     Name: Jing Bojorquez    : 1965     MRN: [id number]     Chief Complaint  Cough (Cough, head congestion, drainage x 3 days)    Subjective     Jing Bojorquez is a 59 y.o. female c/o cough and congestion x 1 week.  She denies any known fever, body aches, or chills.  She denies any shortness of breath or tightness in her chest.  She admits sinus pressure and a headache.  She has been taking over-the-counter sinus medication and some leftover cough medication that she had from a previous illness.      Review of Systems:   Review of Systems   Constitutional:  Negative for appetite change, chills, fatigue and fever.   HENT:  Positive for congestion, postnasal drip, sinus pressure and sore throat. Negative for ear pain, rhinorrhea and sneezing.    Respiratory:  Positive for cough. Negative for chest tightness and shortness of breath.    Gastrointestinal:  Positive for diarrhea and vomiting. Negative for nausea.   Musculoskeletal:  Negative for myalgias.   Neurological:  Positive for headache.       Past Medical History:   Past Medical History:   Diagnosis Date    Allergic     Diabetes mellitus     Hyperlipidemia        Past Surgical History:   Past Surgical History:   Procedure Laterality Date    APPENDECTOMY      CHOLECYSTECTOMY         Family History:   Family History   Problem Relation Age of Onset    Hypertension Mother     Diabetes Mother     Cancer Mother     Hypertension Father     Diabetes Father        Social History:   Social History     Socioeconomic History    Marital status: Single   Tobacco Use    Smoking status: Never     Passive exposure: Current    Smokeless tobacco: Never   Vaping Use    Vaping status: Never Used   Substance and Sexual Activity    Alcohol use: Never    Drug use: Never    Sexual activity: Yes     Partners: Female       Immunizations:   Immunization History   Administered Date(s) Administered    COVID-19 (MODERNA) 1st,2nd,3rd Dose Monovalent 2021, 2021     COVID-19 (MODERNA) Monovalent Original Booster 12/18/2021    COVID-19 (UNSPECIFIED) 12/07/2023    DTP 1967, 02/01/1968, 04/01/1968, 04/09/1969    Fluzone (or Fluarix & Flulaval for VFC) >6mos 11/16/2021    Hepatitis A 06/05/2019    Measles 06/01/1968, 05/01/1977    OPV 1967, 02/01/1968, 04/09/1969, 09/01/1971, 03/01/1973, 08/13/1976    Pneumococcal Conjugate 20-Valent (PCV20) 12/20/2023    Rubella 08/05/1970    Smallpox 02/25/1970    TST, UF 09/03/1992, 03/15/2006, 02/11/2008    Td (TDVAX) 05/24/2006    Td, Not Adsorbed 08/11/1976, 08/31/1994    Tdap 02/08/2016        Medications:     Current Outpatient Medications:     amitriptyline (ELAVIL) 25 MG tablet, Take 1 tablet by mouth every night at bedtime., Disp: 90 tablet, Rfl: 1    aspirin 81 MG EC tablet, Take 1 tablet by mouth Daily., Disp: 90 tablet, Rfl: 1    atorvastatin (LIPITOR) 40 MG tablet, Take 1 tablet by mouth Daily. For heart protection and stroke prevention, Disp: 90 tablet, Rfl: 1    benazepril (LOTENSIN) 20 MG tablet, Take 1 tablet by mouth Daily., Disp: 90 tablet, Rfl: 1    Blood Glucose Monitoring Suppl (Blood Glucose Monitor System) w/Device kit, Check BG every morning before breakfast/eating  and as needed, Disp: 1 each, Rfl: 0    cetirizine (zyrTEC) 10 MG tablet, Take 1 tablet by mouth Daily., Disp: 90 tablet, Rfl: 1    dapagliflozin Propanediol (Farxiga) 10 MG tablet, Take 10 mg by mouth Daily., Disp: 90 tablet, Rfl: 1    glipizide (Glucotrol) 10 MG tablet, Take 2 tablets by mouth 2 (Two) Times a Day Before Meals., Disp: 360 tablet, Rfl: 1    metFORMIN ER (GLUCOPHAGE-XR) 500 MG 24 hr tablet, Take 2 tablets by mouth Daily With Breakfast & Dinner., Disp: 360 tablet, Rfl: 1    mometasone (ELOCON) 0.1 % cream, Apply 1 Application topically to the appropriate area as directed Daily As Needed (rash/eczema)., Disp: 45 g, Rfl: 5    omeprazole (priLOSEC) 40 MG capsule, Take 1 capsule by mouth 2 (Two) Times a Day., Disp: 180 capsule, Rfl: 1     "ondansetron (Zofran) 4 MG tablet, Take 1 tablet by mouth Every 8 (Eight) Hours As Needed for Nausea or Vomiting., Disp: 30 tablet, Rfl: 3    pioglitazone (Actos) 15 MG tablet, Take 1 tablet by mouth Daily. For diabetes, Disp: 90 tablet, Rfl: 1    Qutenza, 4 Patch, 8 % kit topical system, Apply 8 patches topically to the appropriate area as directed Every 3 (Three) Months., Disp: , Rfl:     doxycycline (VIBRAMYCIN) 100 MG capsule, Take 1 capsule by mouth 2 (Two) Times a Day., Disp: 20 capsule, Rfl: 0    Allergies:   Allergies   Allergen Reactions    Cefaclor Anaphylaxis    Penicillins Anaphylaxis    Azithromycin Hives    Morphine Headache    Ciprofloxacin Rash    Metformin Other (See Comments)       Objective     Vital Signs  /84 (BP Location: Right arm, Patient Position: Sitting, Cuff Size: Adult)   Pulse 98   Ht 163.8 cm (64.5\")   Wt 59.9 kg (132 lb)   SpO2 98%   BMI 22.31 kg/m²   Estimated body mass index is 22.31 kg/m² as calculated from the following:    Height as of this encounter: 163.8 cm (64.5\").    Weight as of this encounter: 59.9 kg (132 lb).    BMI is within normal parameters. No other follow-up for BMI required.      Physical Exam  Vitals and nursing note reviewed.   Constitutional:       General: She is not in acute distress.     Appearance: Normal appearance. She is not ill-appearing.   HENT:      Head: Normocephalic and atraumatic.      Ears:      Comments: TMs dull bilaterally     Nose:      Right Sinus: Frontal sinus tenderness present. No maxillary sinus tenderness.      Left Sinus: Frontal sinus tenderness present. No maxillary sinus tenderness.      Mouth/Throat:      Pharynx: Posterior oropharyngeal erythema and postnasal drip present. No pharyngeal swelling.   Cardiovascular:      Rate and Rhythm: Normal rate and regular rhythm.      Pulses: Normal pulses.      Heart sounds: Normal heart sounds.   Pulmonary:      Effort: Pulmonary effort is normal. No respiratory distress.      " Breath sounds: Normal breath sounds.   Musculoskeletal:      Right lower leg: No edema.      Left lower leg: No edema.   Lymphadenopathy:      Cervical: Cervical adenopathy present.   Skin:     General: Skin is warm and dry.   Neurological:      General: No focal deficit present.      Mental Status: She is alert and oriented to person, place, and time.      Coordination: Coordination normal.      Gait: Gait normal.   Psychiatric:         Mood and Affect: Mood normal.         Behavior: Behavior normal.          Results:  Recent Results (from the past 24 hours)   Covid-19 + Flu A&B AG, Veritor    Collection Time: 12/14/24 11:57 AM    Specimen: Swab   Result Value Ref Range    SARS Antigen Not Detected Not Detected, Presumptive Negative    Influenza A Antigen SWEETIE Not Detected Not Detected    Influenza B Antigen SWEETIE Not Detected Not Detected    Internal Control Passed Passed    Lot Number [id number]     Expiration Date 9,042,025           Assessment and Plan       Diagnoses and all orders for this visit:    1. Acute frontal sinusitis, recurrence not specified (Primary)  Assessment & Plan:  Advised increase fluids, symptomatic treatment, and monitoring.    Orders:  -     Covid-19 + Flu A&B AG, Veritor  -     doxycycline (VIBRAMYCIN) 100 MG capsule; Take 1 capsule by mouth 2 (Two) Times a Day.  Dispense: 20 capsule; Refill: 0        Follow Up  Return if symptoms worsen or fail to improve.    Sandy Jimenez PA-C  [facility]

## 2025-01-08 ENCOUNTER — TELEPHONE (OUTPATIENT)
Dept: FAMILY MEDICINE CLINIC | Facility: CLINIC | Age: 60
End: 2025-01-08
Payer: MEDICAID

## 2025-01-08 NOTE — TELEPHONE ENCOUNTER
TRIED TO CALL PATIENT AND LEAVE A VOICEMAIL BUT VOICEMAIL BOX HAS NOT BEEN SET UP YET CANCELLED PATIENTS APPT DUE TO WEATHER WAS CALLING TO RESCHEDULED IT

## 2025-01-18 ENCOUNTER — OFFICE VISIT (OUTPATIENT)
Dept: FAMILY MEDICINE CLINIC | Facility: CLINIC | Age: 60
End: 2025-01-18
Payer: MEDICAID

## 2025-01-18 VITALS
HEIGHT: 65 IN | OXYGEN SATURATION: 97 % | BODY MASS INDEX: 22.66 KG/M2 | WEIGHT: 136 LBS | SYSTOLIC BLOOD PRESSURE: 126 MMHG | DIASTOLIC BLOOD PRESSURE: 84 MMHG | HEART RATE: 90 BPM

## 2025-01-18 DIAGNOSIS — S39.012A STRAIN OF LUMBAR REGION, INITIAL ENCOUNTER: ICD-10-CM

## 2025-01-18 DIAGNOSIS — J06.9 ACUTE URI: Primary | ICD-10-CM

## 2025-01-18 RX ORDER — KETOROLAC TROMETHAMINE 30 MG/ML
60 INJECTION, SOLUTION INTRAMUSCULAR; INTRAVENOUS
Status: COMPLETED | OUTPATIENT
Start: 2025-01-18 | End: 2025-01-18

## 2025-01-18 RX ORDER — CHLORCYCLIZINE HYDROCHLORIDE AND PSEUDOEPHEDRINE HYDROCHLORIDE 25; 60 MG/1; MG/1
1 TABLET ORAL 2 TIMES DAILY
Qty: 30 TABLET | Refills: 0 | Status: SHIPPED | OUTPATIENT
Start: 2025-01-18

## 2025-01-18 RX ORDER — DOXYCYCLINE 100 MG/1
100 CAPSULE ORAL 2 TIMES DAILY
Qty: 20 CAPSULE | Refills: 0 | Status: SHIPPED | OUTPATIENT
Start: 2025-01-18

## 2025-01-18 RX ORDER — GABAPENTIN 600 MG/1
TABLET ORAL
COMMUNITY
Start: 2025-01-05

## 2025-01-18 RX ORDER — TRIAMCINOLONE ACETONIDE 40 MG/ML
80 INJECTION, SUSPENSION INTRA-ARTICULAR; INTRAMUSCULAR ONCE
Status: COMPLETED | OUTPATIENT
Start: 2025-01-18 | End: 2025-01-18

## 2025-01-18 RX ORDER — CYCLOBENZAPRINE HCL 10 MG
1 TABLET ORAL 3 TIMES DAILY
COMMUNITY
Start: 2024-12-31 | End: 2025-01-18

## 2025-01-18 RX ORDER — METHOCARBAMOL 750 MG/1
750 TABLET, FILM COATED ORAL 3 TIMES DAILY
Qty: 90 TABLET | Refills: 1 | Status: SHIPPED | OUTPATIENT
Start: 2025-01-18

## 2025-01-18 RX ADMIN — KETOROLAC TROMETHAMINE 60 MG: 30 INJECTION, SOLUTION INTRAMUSCULAR; INTRAVENOUS at 12:08

## 2025-01-18 RX ADMIN — TRIAMCINOLONE ACETONIDE 80 MG: 40 INJECTION, SUSPENSION INTRA-ARTICULAR; INTRAMUSCULAR at 12:09

## 2025-01-18 NOTE — PROGRESS NOTES
"Chief Complaint  URI (Drainage, ear pain for 2 days)    Subjective          Jing Bojorquez presents to [facility]    URI   Associated symptoms include congestion, coughing and a sore throat. Pertinent negatives include no chest pain, ear pain, swollen glands or wheezing.    she comes in today for upper respiratory symptoms including sinus pain and pressure, postnasal drip and ear pressure for a couple of days.  She also mentioned that she pulled muscle in her back when she was assisting her father out of his wheelchair.  The muscles are progressively tightening and becoming more sore.    Objective   Vital Signs:   /84 (BP Location: Right arm, Patient Position: Sitting, Cuff Size: Adult)   Pulse 90   Ht 163.8 cm (64.5\")   Wt 61.7 kg (136 lb)   SpO2 97%   BMI 22.98 kg/m²     Body mass index is 22.98 kg/m².    Review of Systems   Constitutional:  Negative for chills, fatigue and fever.   HENT:  Positive for congestion, postnasal drip, sinus pressure and sore throat. Negative for ear pain and swollen glands.    Respiratory:  Positive for cough. Negative for chest tightness, shortness of breath and wheezing.    Cardiovascular:  Negative for chest pain and palpitations.   Musculoskeletal:  Positive for back pain. Negative for myalgias.   Neurological:  Negative for dizziness and headache.   Psychiatric/Behavioral:  Negative for depressed mood. The patient is not nervous/anxious.        Past History:  Medical History: has a past medical history of Allergic, Diabetes mellitus, and Hyperlipidemia.   Surgical History: has a past surgical history that includes Appendectomy and Cholecystectomy.   Family History: family history includes Cancer in her mother; Diabetes in her father and mother; Hypertension in her father and mother.   Social History: reports that she has never smoked. She has been exposed to tobacco smoke. She has never used smokeless tobacco. She reports that she does not " drink alcohol and does not use drugs.      Current Outpatient Medications:     amitriptyline (ELAVIL) 25 MG tablet, Take 1 tablet by mouth every night at bedtime., Disp: 90 tablet, Rfl: 1    aspirin 81 MG EC tablet, Take 1 tablet by mouth Daily., Disp: 90 tablet, Rfl: 1    atorvastatin (LIPITOR) 40 MG tablet, Take 1 tablet by mouth Daily. For heart protection and stroke prevention, Disp: 90 tablet, Rfl: 1    benazepril (LOTENSIN) 20 MG tablet, Take 1 tablet by mouth Daily., Disp: 90 tablet, Rfl: 1    Blood Glucose Monitoring Suppl (Blood Glucose Monitor System) w/Device kit, Check BG every morning before breakfast/eating  and as needed, Disp: 1 each, Rfl: 0    cetirizine (zyrTEC) 10 MG tablet, Take 1 tablet by mouth Daily., Disp: 90 tablet, Rfl: 1    dapagliflozin Propanediol (Farxiga) 10 MG tablet, Take 10 mg by mouth Daily., Disp: 90 tablet, Rfl: 1    gabapentin (NEURONTIN) 600 MG tablet, , Disp: , Rfl:     glipizide (Glucotrol) 10 MG tablet, Take 2 tablets by mouth 2 (Two) Times a Day Before Meals., Disp: 360 tablet, Rfl: 1    metFORMIN ER (GLUCOPHAGE-XR) 500 MG 24 hr tablet, Take 2 tablets by mouth Daily With Breakfast & Dinner., Disp: 360 tablet, Rfl: 1    mometasone (ELOCON) 0.1 % cream, Apply 1 Application topically to the appropriate area as directed Daily As Needed (rash/eczema)., Disp: 45 g, Rfl: 5    omeprazole (priLOSEC) 40 MG capsule, Take 1 capsule by mouth 2 (Two) Times a Day., Disp: 180 capsule, Rfl: 1    ondansetron (Zofran) 4 MG tablet, Take 1 tablet by mouth Every 8 (Eight) Hours As Needed for Nausea or Vomiting., Disp: 30 tablet, Rfl: 3    pioglitazone (Actos) 15 MG tablet, Take 1 tablet by mouth Daily. For diabetes, Disp: 90 tablet, Rfl: 1    Qutenza, 4 Patch, 8 % kit topical system, Apply 8 patches topically to the appropriate area as directed Every 3 (Three) Months., Disp: , Rfl:     Chlorcyclizine-Pseudoephed (Stahist AD) 25-60 MG tablet, Take 1 tablet by mouth 2 (Two) Times a Day., Disp: 30  tablet, Rfl: 0    doxycycline (MONODOX) 100 MG capsule, Take 1 capsule by mouth 2 (Two) Times a Day., Disp: 20 capsule, Rfl: 0    methocarbamol (ROBAXIN) 750 MG tablet, Take 1 tablet by mouth 3 (Three) Times a Day., Disp: 90 tablet, Rfl: 1  No current facility-administered medications for this visit.    Allergies: Cefaclor, Penicillins, Azithromycin, Morphine, Ciprofloxacin, and Metformin    Physical Exam  Vitals reviewed.   Constitutional:       Appearance: Normal appearance.   HENT:      Right Ear: Tympanic membrane normal.      Left Ear: Tympanic membrane normal.      Nose: Congestion present.      Right Sinus: Maxillary sinus tenderness present.      Left Sinus: Maxillary sinus tenderness present.   Cardiovascular:      Rate and Rhythm: Normal rate and regular rhythm.      Heart sounds: Normal heart sounds.   Pulmonary:      Effort: Pulmonary effort is normal.      Breath sounds: Normal breath sounds.   Musculoskeletal:      Cervical back: Neck supple.      Lumbar back: Spasms and tenderness present.      Comments: Pain with movement    Lymphadenopathy:      Cervical: No cervical adenopathy.   Neurological:      General: No focal deficit present.      Mental Status: She is alert and oriented to person, place, and time.   Psychiatric:         Mood and Affect: Mood normal.          Result Review :                   Assessment and Plan    Diagnoses and all orders for this visit:    1. Acute URI (Primary)  Assessment & Plan:  Suspect this is a sinus infection and I am going to give her doxycycline and Stahist and recommended rest and fluids call or return if symptoms persist or worsen.    Orders:  -     POCT SARS-CoV-2 Antigen SWEETIE + Flu    2. Strain of lumbar region, initial encounter  Assessment & Plan:  I am going to give her a Toradol and steroid shot today and follow that up with a prescription for methocarbamol and she can call or return if symptoms persist or worsen.    Orders:  -     ketorolac (TORADOL)  injection 60 mg  -     triamcinolone acetonide (KENALOG-40) injection 80 mg    Other orders  -     doxycycline (MONODOX) 100 MG capsule; Take 1 capsule by mouth 2 (Two) Times a Day.  Dispense: 20 capsule; Refill: 0  -     Chlorcyclizine-Pseudoephed (Stahist AD) 25-60 MG tablet; Take 1 tablet by mouth 2 (Two) Times a Day.  Dispense: 30 tablet; Refill: 0  -     methocarbamol (ROBAXIN) 750 MG tablet; Take 1 tablet by mouth 3 (Three) Times a Day.  Dispense: 90 tablet; Refill: 1        Follow Up   Return if symptoms worsen or fail to improve.  Patient was given instructions and counseling regarding her condition or for health maintenance advice. Please see specific information pulled into the AVS if appropriate.     Melinda Singleton PA-C

## 2025-01-18 NOTE — ASSESSMENT & PLAN NOTE
Suspect this is a sinus infection and I am going to give her doxycycline and Stahist and recommended rest and fluids call or return if symptoms persist or worsen.

## 2025-01-18 NOTE — ASSESSMENT & PLAN NOTE
I am going to give her a Toradol and steroid shot today and follow that up with a prescription for methocarbamol and she can call or return if symptoms persist or worsen.

## 2025-01-29 DIAGNOSIS — E11.65 TYPE 2 DIABETES MELLITUS WITH HYPERGLYCEMIA, WITHOUT LONG-TERM CURRENT USE OF INSULIN: ICD-10-CM

## 2025-01-29 DIAGNOSIS — E11.29 TYPE 2 DIABETES MELLITUS WITH MICROALBUMINURIA, WITHOUT LONG-TERM CURRENT USE OF INSULIN: Chronic | ICD-10-CM

## 2025-01-29 DIAGNOSIS — E11.40 TYPE 2 DIABETES MELLITUS WITH DIABETIC NEUROPATHY, WITHOUT LONG-TERM CURRENT USE OF INSULIN: ICD-10-CM

## 2025-01-29 DIAGNOSIS — R80.9 TYPE 2 DIABETES MELLITUS WITH MICROALBUMINURIA, WITHOUT LONG-TERM CURRENT USE OF INSULIN: Chronic | ICD-10-CM

## 2025-01-29 RX ORDER — GLIPIZIDE 10 MG/1
20 TABLET ORAL
Qty: 360 TABLET | Refills: 1 | Status: SHIPPED | OUTPATIENT
Start: 2025-01-29

## 2025-01-29 NOTE — TELEPHONE ENCOUNTER
Caller: Maryellen Jing    Relationship: Self    Best call back number:   Telephone Information:   Mobile [phone]        Requested Prescriptions:   Requested Prescriptions     Pending Prescriptions Disp Refills    glipizide (Glucotrol) 10 MG tablet 360 tablet 1     Sig: Take 2 tablets by mouth 2 (Two) Times a Day Before Meals.        Pharmacy where request should be sent: [facility] [address] [place] [address] [phone] [facility] [phone]      Last office visit with prescribing clinician: 10/22/2024   Last telemedicine visit with prescribing clinician: Visit date not found   Next office visit with prescribing clinician: 4/22/2025     Additional details provided by patient:     Does the patient have less than a 3 day supply:  [x] Yes  [] No    Would you like a call back once the refill request has been completed: [] Yes [x] No    If the office needs to give you a call back, can they leave a voicemail: [] Yes [x] No    Radha Singleton Rep   01/29/25 13:43 EST

## 2025-03-08 ENCOUNTER — OFFICE VISIT (OUTPATIENT)
Dept: FAMILY MEDICINE CLINIC | Facility: CLINIC | Age: 60
End: 2025-03-08
Payer: MEDICAID

## 2025-03-08 VITALS
DIASTOLIC BLOOD PRESSURE: 74 MMHG | OXYGEN SATURATION: 99 % | WEIGHT: 135.5 LBS | BODY MASS INDEX: 22.57 KG/M2 | HEIGHT: 65 IN | SYSTOLIC BLOOD PRESSURE: 118 MMHG | HEART RATE: 87 BPM

## 2025-03-08 DIAGNOSIS — M54.50 ACUTE LEFT-SIDED LOW BACK PAIN WITHOUT SCIATICA: Primary | ICD-10-CM

## 2025-03-08 DIAGNOSIS — L03.031 CELLULITIS OF TOE OF RIGHT FOOT: ICD-10-CM

## 2025-03-08 PROBLEM — M79.18 PAIN IN BUTTOCK: Status: ACTIVE | Noted: 2024-12-24

## 2025-03-08 PROBLEM — M54.9 BACKACHE: Status: ACTIVE | Noted: 2024-12-24

## 2025-03-08 LAB
BILIRUB BLD-MCNC: NEGATIVE MG/DL
CLARITY, POC: CLEAR
COLOR UR: YELLOW
EXPIRATION DATE: ABNORMAL
GLUCOSE UR STRIP-MCNC: ABNORMAL MG/DL
KETONES UR QL: NEGATIVE
LEUKOCYTE EST, POC: NEGATIVE
Lab: ABNORMAL
NITRITE UR-MCNC: NEGATIVE MG/ML
PH UR: 5.5 [PH] (ref 5–8)
PROT UR STRIP-MCNC: NEGATIVE MG/DL
RBC # UR STRIP: ABNORMAL /UL
SP GR UR: 1.01 (ref 1–1.03)
UROBILINOGEN UR QL: ABNORMAL

## 2025-03-08 RX ORDER — KETOROLAC TROMETHAMINE 30 MG/ML
60 INJECTION, SOLUTION INTRAMUSCULAR; INTRAVENOUS
Status: COMPLETED | OUTPATIENT
Start: 2025-03-08 | End: 2025-03-08

## 2025-03-08 RX ORDER — CLINDAMYCIN HYDROCHLORIDE 300 MG/1
300 CAPSULE ORAL 3 TIMES DAILY
Qty: 30 CAPSULE | Refills: 0 | Status: SHIPPED | OUTPATIENT
Start: 2025-03-08

## 2025-03-08 RX ORDER — MUPIROCIN 20 MG/G
1 OINTMENT TOPICAL 3 TIMES DAILY
Qty: 30 G | Refills: 0 | Status: SHIPPED | OUTPATIENT
Start: 2025-03-08

## 2025-03-08 RX ADMIN — KETOROLAC TROMETHAMINE 60 MG: 30 INJECTION, SOLUTION INTRAMUSCULAR; INTRAVENOUS at 10:55

## 2025-03-08 NOTE — ASSESSMENT & PLAN NOTE
I suspect this is just musculoskeletal in nature but I did tell her that if it starts to radiate into the groin or become more intense than she may need to go to the emergency room and have a workup for possible stone.  I am giving her a Toradol shot and she was advised to continue using the muscle relaxers.  She can also take over-the-counter Tylenol or ibuprofen as needed.

## 2025-03-08 NOTE — PROGRESS NOTES
"Chief Complaint  Back Pain (Left lower back pain x4 days) and Toe Pain (Right big toe redness, tenderness  x2 days)    Subjective          Jing Bojorquez presents to [facility]    History of Present Illness patient is reporting left sided back pain for 4 days it is just below the rib cage and aches bending down a little bit.  It has not been intense but the muscle relaxer she was given previously has not been helping much.  It is tender and sore to touch and movement does make it worse.  She is also having some right great toe pain and redness and tenderness for the past couple of days and there is a sore that is come up on the toe.    Objective   Vital Signs:   /74   Pulse 87   Ht 163.8 cm (64.5\")   Wt 61.5 kg (135 lb 8 oz)   SpO2 99%   BMI 22.90 kg/m²     Body mass index is 22.9 kg/m².    Review of Systems   Constitutional:  Negative for chills, fatigue and fever.   Respiratory:  Negative for cough, shortness of breath and wheezing.    Cardiovascular:  Negative for chest pain and palpitations.   Musculoskeletal:  Positive for back pain.   Skin:  Positive for wound (with slight redness).   Neurological: Negative.    Psychiatric/Behavioral: Negative.         Past History:  Medical History: has a past medical history of Allergic, Diabetes mellitus, and Hyperlipidemia.   Surgical History: has a past surgical history that includes Appendectomy and Cholecystectomy.   Family History: family history includes Cancer in her mother; Diabetes in her father and mother; Hypertension in her father and mother.   Social History: reports that she has never smoked. She has been exposed to tobacco smoke. She has never used smokeless tobacco. She reports that she does not drink alcohol and does not use drugs.      Current Outpatient Medications:     amitriptyline (ELAVIL) 25 MG tablet, Take 1 tablet by mouth every night at bedtime., Disp: 90 tablet, Rfl: 1    aspirin 81 MG EC tablet, Take 1 " tablet by mouth Daily., Disp: 90 tablet, Rfl: 1    atorvastatin (LIPITOR) 40 MG tablet, Take 1 tablet by mouth Daily. For heart protection and stroke prevention, Disp: 90 tablet, Rfl: 1    benazepril (LOTENSIN) 20 MG tablet, Take 1 tablet by mouth Daily., Disp: 90 tablet, Rfl: 1    Blood Glucose Monitoring Suppl (Blood Glucose Monitor System) w/Device kit, Check BG every morning before breakfast/eating  and as needed, Disp: 1 each, Rfl: 0    cetirizine (zyrTEC) 10 MG tablet, Take 1 tablet by mouth Daily., Disp: 90 tablet, Rfl: 1    Chlorcyclizine-Pseudoephed (Stahist AD) 25-60 MG tablet, Take 1 tablet by mouth 2 (Two) Times a Day., Disp: 30 tablet, Rfl: 0    dapagliflozin Propanediol (Farxiga) 10 MG tablet, Take 10 mg by mouth Daily., Disp: 90 tablet, Rfl: 1    gabapentin (NEURONTIN) 600 MG tablet, , Disp: , Rfl:     glipizide (Glucotrol) 10 MG tablet, Take 2 tablets by mouth 2 (Two) Times a Day Before Meals., Disp: 360 tablet, Rfl: 1    metFORMIN ER (GLUCOPHAGE-XR) 500 MG 24 hr tablet, Take 2 tablets by mouth Daily With Breakfast & Dinner., Disp: 360 tablet, Rfl: 1    methocarbamol (ROBAXIN) 750 MG tablet, Take 1 tablet by mouth 3 (Three) Times a Day., Disp: 90 tablet, Rfl: 1    mometasone (ELOCON) 0.1 % cream, Apply 1 Application topically to the appropriate area as directed Daily As Needed (rash/eczema)., Disp: 45 g, Rfl: 5    omeprazole (priLOSEC) 40 MG capsule, Take 1 capsule by mouth 2 (Two) Times a Day., Disp: 180 capsule, Rfl: 1    ondansetron (Zofran) 4 MG tablet, Take 1 tablet by mouth Every 8 (Eight) Hours As Needed for Nausea or Vomiting., Disp: 30 tablet, Rfl: 3    pioglitazone (Actos) 15 MG tablet, Take 1 tablet by mouth Daily. For diabetes, Disp: 90 tablet, Rfl: 1    Qutenza, 4 Patch, 8 % kit topical system, Apply 8 patches topically to the appropriate area as directed Every 3 (Three) Months., Disp: , Rfl:     clindamycin (Cleocin) 300 MG capsule, Take 1 capsule by mouth 3 (Three) Times a Day., Disp:  30 capsule, Rfl: 0    mupirocin (BACTROBAN) 2 % ointment, Apply 1 Application topically to the appropriate area as directed 3 (Three) Times a Day., Disp: 30 g, Rfl: 0  No current facility-administered medications for this visit.    Allergies: Cefaclor, Penicillins, Azithromycin, Morphine, Ciprofloxacin, and Metformin    Physical Exam  Constitutional:       Appearance: Normal appearance.   Musculoskeletal:      Thoracic back: Tenderness present.      Lumbar back: Tenderness present.      Comments: Pain with movement   Feet:      Right foot:      Skin integrity: Blister, erythema and callus present.      Comments: Right great toe  Neurological:      Mental Status: She is alert and oriented to person, place, and time.   Psychiatric:         Behavior: Behavior normal.          Result Review :                   Assessment and Plan    Diagnoses and all orders for this visit:    1. Acute left-sided low back pain without sciatica (Primary)  Assessment & Plan:  I suspect this is just musculoskeletal in nature but I did tell her that if it starts to radiate into the groin or become more intense than she may need to go to the emergency room and have a workup for possible stone.  I am giving her a Toradol shot and she was advised to continue using the muscle relaxers.  She can also take over-the-counter Tylenol or ibuprofen as needed.    Orders:  -     POC Urinalysis Dipstick, Automated  -     ketorolac (TORADOL) injection 60 mg    2. Cellulitis of toe of right foot  Assessment & Plan:  I am treating this with clindamycin and mupirocin ointment and I told her that if it did not look to be improving significantly by the beginning of the week that she should call and come back in for somebody to take another look at it.  There is a superficial ulcer on the side of the toe looks like it may have started as a blister, it does appear to be healing.      Other orders  -     clindamycin (Cleocin) 300 MG capsule; Take 1 capsule by  mouth 3 (Three) Times a Day.  Dispense: 30 capsule; Refill: 0  -     mupirocin (BACTROBAN) 2 % ointment; Apply 1 Application topically to the appropriate area as directed 3 (Three) Times a Day.  Dispense: 30 g; Refill: 0        Follow Up   Return if symptoms worsen or fail to improve, for Keep appt in April.  Patient was given instructions and counseling regarding her condition or for health maintenance advice. Please see specific information pulled into the AVS if appropriate.     Melinda Singleton PA-C

## 2025-03-08 NOTE — ASSESSMENT & PLAN NOTE
I am treating this with clindamycin and mupirocin ointment and I told her that if it did not look to be improving significantly by the beginning of the week that she should call and come back in for somebody to take another look at it.  There is a superficial ulcer on the side of the toe looks like it may have started as a blister, it does appear to be healing.

## 2025-04-22 ENCOUNTER — OFFICE VISIT (OUTPATIENT)
Dept: FAMILY MEDICINE CLINIC | Facility: CLINIC | Age: 60
End: 2025-04-22
Payer: MEDICAID

## 2025-04-22 VITALS
HEART RATE: 80 BPM | BODY MASS INDEX: 22.49 KG/M2 | OXYGEN SATURATION: 98 % | SYSTOLIC BLOOD PRESSURE: 124 MMHG | WEIGHT: 135 LBS | DIASTOLIC BLOOD PRESSURE: 72 MMHG | HEIGHT: 65 IN

## 2025-04-22 DIAGNOSIS — E11.29 TYPE 2 DIABETES MELLITUS WITH MICROALBUMINURIA, WITHOUT LONG-TERM CURRENT USE OF INSULIN: Chronic | ICD-10-CM

## 2025-04-22 DIAGNOSIS — E11.40 TYPE 2 DIABETES MELLITUS WITH DIABETIC NEUROPATHY, WITHOUT LONG-TERM CURRENT USE OF INSULIN: ICD-10-CM

## 2025-04-22 DIAGNOSIS — R80.9 TYPE 2 DIABETES MELLITUS WITH MICROALBUMINURIA, WITHOUT LONG-TERM CURRENT USE OF INSULIN: Chronic | ICD-10-CM

## 2025-04-22 DIAGNOSIS — K21.9 GERD WITHOUT ESOPHAGITIS: Chronic | ICD-10-CM

## 2025-04-22 DIAGNOSIS — E11.65 TYPE 2 DIABETES MELLITUS WITH HYPERGLYCEMIA, WITHOUT LONG-TERM CURRENT USE OF INSULIN: Primary | ICD-10-CM

## 2025-04-22 DIAGNOSIS — G62.9 NEUROPATHY: Chronic | ICD-10-CM

## 2025-04-22 DIAGNOSIS — E78.2 HYPERLIPIDEMIA, MIXED: Chronic | ICD-10-CM

## 2025-04-22 DIAGNOSIS — I10 HYPERTENSION, ESSENTIAL: Chronic | ICD-10-CM

## 2025-04-22 DIAGNOSIS — J30.1 SEASONAL ALLERGIC RHINITIS DUE TO POLLEN: Chronic | ICD-10-CM

## 2025-04-22 LAB
EXPIRATION DATE: ABNORMAL
EXPIRATION DATE: NORMAL
HBA1C MFR BLD: 9 % (ref 4.5–5.7)
Lab: ABNORMAL
Lab: NORMAL
POC ALBUMIN, URINE: 30 MG/L
POC CREATININE, URINE: 100 MG/DL
POC URINE ALB/CREA RATIO: <30

## 2025-04-22 RX ORDER — FLUTICASONE PROPIONATE 50 MCG
2 SPRAY, SUSPENSION (ML) NASAL DAILY
Qty: 48 G | Refills: 2 | Status: SHIPPED | OUTPATIENT
Start: 2025-04-22

## 2025-04-22 RX ORDER — BENAZEPRIL HYDROCHLORIDE 20 MG/1
20 TABLET ORAL DAILY
Qty: 90 TABLET | Refills: 1 | Status: SHIPPED | OUTPATIENT
Start: 2025-04-22

## 2025-04-22 RX ORDER — ONDANSETRON 4 MG/1
4 TABLET, FILM COATED ORAL EVERY 8 HOURS PRN
Qty: 30 TABLET | Refills: 3 | Status: SHIPPED | OUTPATIENT
Start: 2025-04-22

## 2025-04-22 RX ORDER — CETIRIZINE HYDROCHLORIDE 10 MG/1
10 TABLET ORAL DAILY
Qty: 90 TABLET | Refills: 1 | Status: SHIPPED | OUTPATIENT
Start: 2025-04-22

## 2025-04-22 RX ORDER — OMEPRAZOLE 40 MG/1
40 CAPSULE, DELAYED RELEASE ORAL 2 TIMES DAILY
Qty: 180 CAPSULE | Refills: 1 | Status: SHIPPED | OUTPATIENT
Start: 2025-04-22

## 2025-04-22 RX ORDER — PIOGLITAZONE 30 MG/1
30 TABLET ORAL DAILY
Qty: 90 TABLET | Refills: 1 | Status: SHIPPED | OUTPATIENT
Start: 2025-04-22

## 2025-04-22 RX ORDER — METFORMIN HYDROCHLORIDE 500 MG/1
1000 TABLET, EXTENDED RELEASE ORAL
Qty: 360 TABLET | Refills: 1 | Status: SHIPPED | OUTPATIENT
Start: 2025-04-22

## 2025-04-22 RX ORDER — ATORVASTATIN CALCIUM 40 MG/1
40 TABLET, FILM COATED ORAL DAILY
Qty: 90 TABLET | Refills: 1 | Status: SHIPPED | OUTPATIENT
Start: 2025-04-22

## 2025-04-22 RX ORDER — PREGABALIN 150 MG/1
150 CAPSULE ORAL 3 TIMES DAILY
COMMUNITY
Start: 2025-04-18

## 2025-04-22 RX ORDER — ASPIRIN 81 MG/1
81 TABLET ORAL DAILY
Qty: 90 TABLET | Refills: 1 | Status: SHIPPED | OUTPATIENT
Start: 2025-04-22

## 2025-04-22 RX ORDER — GLIPIZIDE 10 MG/1
20 TABLET ORAL
Qty: 360 TABLET | Refills: 1 | Status: SHIPPED | OUTPATIENT
Start: 2025-04-22

## 2025-04-22 RX ORDER — DAPAGLIFLOZIN 10 MG/1
10 TABLET, FILM COATED ORAL DAILY
Qty: 90 TABLET | Refills: 1 | Status: SHIPPED | OUTPATIENT
Start: 2025-04-22

## 2025-04-22 NOTE — PROGRESS NOTES
"Chief Complaint  DM, Hyperlipidemia, Neuropathy, HTN, GERD, and Elevated LFTs.    Subjective    History of Present Illness:  Jing Bojorquez is a 59 y.o. female who presents today for followup visit and medication refills    Past labs with elevated LFTs.  Liver ultrasound done Sept 2023 returned normal.     She has not been taking any insulin.  Declines injection options including once-weekly options.    Last A1c did show ongoing poor control at 9.0 - which was down from 11.9.   We did add Actos last visit - no problems with 15mg dose - willing for adjustment today to help get to goal.  A1c still 9.0 today     We did set up endocrinology consult last year but she ended up canceling this and declines endocrinology referral.    Discussed past due mammogram-patient declines.  Declines pap smear     Discussed colon cancer screening and she reports this was up-to-date with EGD with GI at [facility].  Requested report for chart update.    Encouraged past-due diabetic eye exam.    No alcohol or other drugs.  No smoking    Urine microalb pos 4/22/25  Eye exam due.  Encouraged past-due eye exam  Foot exam uptodate 12/2024 with podiatry - off gabapentin.  She is on Lyrica elavil for neuropathy      Objective   Vital Signs:   /72   Pulse 80   Ht 163.8 cm (64.5\")   Wt 61.2 kg (135 lb)   SpO2 98%   BMI 22.81 kg/m²     Review of Systems   Constitutional:  Negative for appetite change, chills and fever.   HENT:  Negative for hearing loss.    Eyes:  Negative for blurred vision.   Respiratory:  Negative for chest tightness.    Cardiovascular:  Negative for chest pain.   Gastrointestinal:  Negative for abdominal pain.   Musculoskeletal:  Negative for gait problem.   Skin:  Negative for rash.   Neurological:  Positive for numbness.   Psychiatric/Behavioral:  Negative for depressed mood.        Past History:  Medical History: has a past medical history of Allergic, Diabetes mellitus, and Hyperlipidemia. "   Surgical History: has a past surgical history that includes Appendectomy and Cholecystectomy.   Family History: family history includes Cancer in her mother; Diabetes in her father and mother; Hypertension in her father and mother.   Social History: reports that she has never smoked. She has been exposed to tobacco smoke. She has never used smokeless tobacco. She reports that she does not drink alcohol and does not use drugs.      Current Outpatient Medications:     amitriptyline (ELAVIL) 25 MG tablet, Take 1 tablet by mouth every night at bedtime., Disp: 90 tablet, Rfl: 1    aspirin 81 MG EC tablet, Take 1 tablet by mouth Daily., Disp: 90 tablet, Rfl: 1    atorvastatin (LIPITOR) 40 MG tablet, Take 1 tablet by mouth Daily. For heart protection and stroke prevention, Disp: 90 tablet, Rfl: 1    benazepril (LOTENSIN) 20 MG tablet, Take 1 tablet by mouth Daily., Disp: 90 tablet, Rfl: 1    Blood Glucose Monitoring Suppl (Blood Glucose Monitor System) w/Device kit, Check BG every morning before breakfast/eating  and as needed, Disp: 1 each, Rfl: 0    cetirizine (zyrTEC) 10 MG tablet, Take 1 tablet by mouth Daily., Disp: 90 tablet, Rfl: 1    dapagliflozin Propanediol (Farxiga) 10 MG tablet, Take 10 mg by mouth Daily., Disp: 90 tablet, Rfl: 1    glipizide (Glucotrol) 10 MG tablet, Take 2 tablets by mouth 2 (Two) Times a Day Before Meals., Disp: 360 tablet, Rfl: 1    metFORMIN ER (GLUCOPHAGE-XR) 500 MG 24 hr tablet, Take 2 tablets by mouth Daily With Breakfast & Dinner., Disp: 360 tablet, Rfl: 1    mometasone (ELOCON) 0.1 % cream, Apply 1 Application topically to the appropriate area as directed Daily As Needed (rash/eczema)., Disp: 45 g, Rfl: 5    omeprazole (priLOSEC) 40 MG capsule, Take 1 capsule by mouth 2 (Two) Times a Day., Disp: 180 capsule, Rfl: 1    ondansetron (Zofran) 4 MG tablet, Take 1 tablet by mouth Every 8 (Eight) Hours As Needed for Nausea or Vomiting., Disp: 30 tablet, Rfl: 3    pioglitazone (Actos) 30 MG  tablet, Take 1 tablet by mouth Daily. For diabetes (NOTE DOSE INCREASE), Disp: 90 tablet, Rfl: 1    pregabalin (LYRICA) 150 MG capsule, Take 1 capsule by mouth 3 (Three) Times a Day., Disp: , Rfl:     Qutenza, 4 Patch, 8 % kit topical system, Apply 8 patches topically to the appropriate area as directed Every 3 (Three) Months., Disp: , Rfl:     fluticasone (FLONASE) 50 MCG/ACT nasal spray, Administer 2 sprays into the nostril(s) as directed by provider Daily., Disp: 48 g, Rfl: 2    Allergies: Cefaclor, Penicillins, Azithromycin, Morphine, Ciprofloxacin, and Metformin    Physical Exam  Constitutional:       Appearance: Normal appearance.   HENT:      Head: Normocephalic.      Right Ear: External ear normal.      Left Ear: External ear normal.      Nose: Nose normal.   Eyes:      Pupils: Pupils are equal, round, and reactive to light.   Cardiovascular:      Rate and Rhythm: Normal rate and regular rhythm.      Heart sounds: Normal heart sounds. No murmur heard.     No friction rub. No gallop.   Pulmonary:      Effort: Pulmonary effort is normal.      Breath sounds: Normal breath sounds.   Musculoskeletal:         General: Normal range of motion.      Cervical back: Normal range of motion.   Skin:     General: Skin is warm and dry.   Neurological:      General: No focal deficit present.      Mental Status: She is alert.   Psychiatric:         Mood and Affect: Mood normal.         Behavior: Behavior normal.         Thought Content: Thought content normal.          Result Review                   Assessment and Plan  Diagnoses and all orders for this visit:    1. Type 2 diabetes mellitus with hyperglycemia, without long-term current use of insulin (Primary)  Assessment & Plan:  Diabetes is improving with treatment.   Medication changes per orders.  Recommended an ADA diet.  Regular aerobic exercise.  Reminded to get yearly retinal exam.  Diabetes will be reassessed in 6 months    Adjusted up Actos to 30mg.  Discussed  diet and exercise efforts.      Recheck in 4 to 6 months or sooner problems arise    Orders:  -     aspirin 81 MG EC tablet; Take 1 tablet by mouth Daily.  Dispense: 90 tablet; Refill: 1  -     atorvastatin (LIPITOR) 40 MG tablet; Take 1 tablet by mouth Daily. For heart protection and stroke prevention  Dispense: 90 tablet; Refill: 1  -     dapagliflozin Propanediol (Farxiga) 10 MG tablet; Take 10 mg by mouth Daily.  Dispense: 90 tablet; Refill: 1  -     glipizide (Glucotrol) 10 MG tablet; Take 2 tablets by mouth 2 (Two) Times a Day Before Meals.  Dispense: 360 tablet; Refill: 1  -     metFORMIN ER (GLUCOPHAGE-XR) 500 MG 24 hr tablet; Take 2 tablets by mouth Daily With Breakfast & Dinner.  Dispense: 360 tablet; Refill: 1  -     pioglitazone (Actos) 30 MG tablet; Take 1 tablet by mouth Daily. For diabetes (NOTE DOSE INCREASE)  Dispense: 90 tablet; Refill: 1    2. Type 2 diabetes mellitus with microalbuminuria, without long-term current use of insulin  Assessment & Plan:  See above    Orders:  -     POCT glycated hemoglobin, total  -     POC Albumin/Creatinine Ratio Urine  -     aspirin 81 MG EC tablet; Take 1 tablet by mouth Daily.  Dispense: 90 tablet; Refill: 1  -     atorvastatin (LIPITOR) 40 MG tablet; Take 1 tablet by mouth Daily. For heart protection and stroke prevention  Dispense: 90 tablet; Refill: 1  -     benazepril (LOTENSIN) 20 MG tablet; Take 1 tablet by mouth Daily.  Dispense: 90 tablet; Refill: 1  -     glipizide (Glucotrol) 10 MG tablet; Take 2 tablets by mouth 2 (Two) Times a Day Before Meals.  Dispense: 360 tablet; Refill: 1  -     metFORMIN ER (GLUCOPHAGE-XR) 500 MG 24 hr tablet; Take 2 tablets by mouth Daily With Breakfast & Dinner.  Dispense: 360 tablet; Refill: 1  -     pioglitazone (Actos) 30 MG tablet; Take 1 tablet by mouth Daily. For diabetes (NOTE DOSE INCREASE)  Dispense: 90 tablet; Refill: 1  -     Comprehensive Metabolic Panel; Future  -     Lipid Panel; Future  -     Lipid Panel  -      Comprehensive Metabolic Panel    3. Type 2 diabetes mellitus with diabetic neuropathy, without long-term current use of insulin  Assessment & Plan:  See above.      Ongoing follow-up with pain management for diabetic neuropathy - now on Lyrica     Orders:  -     POCT glycated hemoglobin, total  -     POC Albumin/Creatinine Ratio Urine  -     amitriptyline (ELAVIL) 25 MG tablet; Take 1 tablet by mouth every night at bedtime.  Dispense: 90 tablet; Refill: 1  -     aspirin 81 MG EC tablet; Take 1 tablet by mouth Daily.  Dispense: 90 tablet; Refill: 1  -     atorvastatin (LIPITOR) 40 MG tablet; Take 1 tablet by mouth Daily. For heart protection and stroke prevention  Dispense: 90 tablet; Refill: 1  -     dapagliflozin Propanediol (Farxiga) 10 MG tablet; Take 10 mg by mouth Daily.  Dispense: 90 tablet; Refill: 1  -     glipizide (Glucotrol) 10 MG tablet; Take 2 tablets by mouth 2 (Two) Times a Day Before Meals.  Dispense: 360 tablet; Refill: 1  -     metFORMIN ER (GLUCOPHAGE-XR) 500 MG 24 hr tablet; Take 2 tablets by mouth Daily With Breakfast & Dinner.  Dispense: 360 tablet; Refill: 1  -     pioglitazone (Actos) 30 MG tablet; Take 1 tablet by mouth Daily. For diabetes (NOTE DOSE INCREASE)  Dispense: 90 tablet; Refill: 1  -     Comprehensive Metabolic Panel; Future  -     Lipid Panel; Future  -     Lipid Panel  -     Comprehensive Metabolic Panel    4. HTN  Assessment & Plan:  Hypertension is stable and controlled  Continue current treatment regimen.  Weight loss.  Regular aerobic exercise.  Blood pressure will be reassessed in 6 months.    Orders:  -     benazepril (LOTENSIN) 20 MG tablet; Take 1 tablet by mouth Daily.  Dispense: 90 tablet; Refill: 1  -     Comprehensive Metabolic Panel; Future  -     Lipid Panel; Future  -     Lipid Panel  -     Comprehensive Metabolic Panel    5. Hyperlipidemia, mixed  Assessment & Plan:  Continue atorvastatin.  Awaiting recheck on nonfasting labs    Orders:  -     atorvastatin  (LIPITOR) 40 MG tablet; Take 1 tablet by mouth Daily. For heart protection and stroke prevention  Dispense: 90 tablet; Refill: 1  -     Comprehensive Metabolic Panel; Future  -     Lipid Panel; Future  -     Lipid Panel  -     Comprehensive Metabolic Panel    6. Neuropathy  Assessment & Plan:  Cont elavil    Following now with podiatry.     Now on lyrica     Orders:  -     amitriptyline (ELAVIL) 25 MG tablet; Take 1 tablet by mouth every night at bedtime.  Dispense: 90 tablet; Refill: 1    7. Seasonal allergic rhinitis due to pollen  Assessment & Plan:  Doing well with current regimen    Orders:  -     cetirizine (zyrTEC) 10 MG tablet; Take 1 tablet by mouth Daily.  Dispense: 90 tablet; Refill: 1  -     fluticasone (FLONASE) 50 MCG/ACT nasal spray; Administer 2 sprays into the nostril(s) as directed by provider Daily.  Dispense: 48 g; Refill: 2    8. GERD without esophagitis  Assessment & Plan:  Well controlled with omeprazole    Orders:  -     omeprazole (priLOSEC) 40 MG capsule; Take 1 capsule by mouth 2 (Two) Times a Day.  Dispense: 180 capsule; Refill: 1    Other orders  -     ondansetron (Zofran) 4 MG tablet; Take 1 tablet by mouth Every 8 (Eight) Hours As Needed for Nausea or Vomiting.  Dispense: 30 tablet; Refill: 3        BMI is within normal parameters. No other follow-up for BMI required.          Follow Up  Return in about 6 months (around 10/22/2025) for Annual physical.    Norbert Cardenas MD

## 2025-04-22 NOTE — ASSESSMENT & PLAN NOTE
Diabetes is improving with treatment.   Medication changes per orders.  Recommended an ADA diet.  Regular aerobic exercise.  Reminded to get yearly retinal exam.  Diabetes will be reassessed in 6 months    Adjusted up Actos to 30mg.  Discussed diet and exercise efforts.      Recheck in 4 to 6 months or sooner problems arise

## 2025-04-23 LAB
ALBUMIN SERPL-MCNC: 4 G/DL (ref 3.8–4.9)
ALP SERPL-CCNC: 128 IU/L (ref 44–121)
ALT SERPL-CCNC: 34 IU/L (ref 0–32)
AST SERPL-CCNC: 23 IU/L (ref 0–40)
BILIRUB SERPL-MCNC: <0.2 MG/DL (ref 0–1.2)
BUN SERPL-MCNC: 15 MG/DL (ref 6–24)
BUN/CREAT SERPL: 21 (ref 9–23)
CALCIUM SERPL-MCNC: 9.2 MG/DL (ref 8.7–10.2)
CHLORIDE SERPL-SCNC: 103 MMOL/L (ref 96–106)
CHOLEST SERPL-MCNC: 116 MG/DL (ref 100–199)
CO2 SERPL-SCNC: 21 MMOL/L (ref 20–29)
CREAT SERPL-MCNC: 0.72 MG/DL (ref 0.57–1)
EGFRCR SERPLBLD CKD-EPI 2021: 97 ML/MIN/1.73
GLOBULIN SER CALC-MCNC: 2.7 G/DL (ref 1.5–4.5)
GLUCOSE SERPL-MCNC: 240 MG/DL (ref 70–99)
HDLC SERPL-MCNC: 46 MG/DL
LDLC SERPL CALC-MCNC: 43 MG/DL (ref 0–99)
POTASSIUM SERPL-SCNC: 4.5 MMOL/L (ref 3.5–5.2)
PROT SERPL-MCNC: 6.7 G/DL (ref 6–8.5)
SODIUM SERPL-SCNC: 139 MMOL/L (ref 134–144)
TRIGL SERPL-MCNC: 160 MG/DL (ref 0–149)
VLDLC SERPL CALC-MCNC: 27 MG/DL (ref 5–40)

## 2025-06-14 ENCOUNTER — OFFICE VISIT (OUTPATIENT)
Dept: FAMILY MEDICINE CLINIC | Facility: CLINIC | Age: 60
End: 2025-06-14
Payer: MEDICAID

## 2025-06-14 VITALS
WEIGHT: 144.1 LBS | SYSTOLIC BLOOD PRESSURE: 144 MMHG | BODY MASS INDEX: 24.01 KG/M2 | OXYGEN SATURATION: 98 % | HEART RATE: 81 BPM | DIASTOLIC BLOOD PRESSURE: 82 MMHG | HEIGHT: 65 IN

## 2025-06-14 DIAGNOSIS — R80.9 TYPE 2 DIABETES MELLITUS WITH MICROALBUMINURIA, WITHOUT LONG-TERM CURRENT USE OF INSULIN: ICD-10-CM

## 2025-06-14 DIAGNOSIS — J01.00 ACUTE NON-RECURRENT MAXILLARY SINUSITIS: ICD-10-CM

## 2025-06-14 DIAGNOSIS — E11.29 TYPE 2 DIABETES MELLITUS WITH MICROALBUMINURIA, WITHOUT LONG-TERM CURRENT USE OF INSULIN: ICD-10-CM

## 2025-06-14 DIAGNOSIS — J40 BRONCHITIS: Primary | ICD-10-CM

## 2025-06-14 DIAGNOSIS — J30.1 SEASONAL ALLERGIC RHINITIS DUE TO POLLEN: ICD-10-CM

## 2025-06-14 DIAGNOSIS — I10 HYPERTENSION, ESSENTIAL: ICD-10-CM

## 2025-06-14 RX ORDER — DOXYCYCLINE 100 MG/1
100 CAPSULE ORAL 2 TIMES DAILY
Qty: 20 CAPSULE | Refills: 0 | Status: SHIPPED | OUTPATIENT
Start: 2025-06-14 | End: 2025-06-24

## 2025-06-14 RX ORDER — MONTELUKAST SODIUM 10 MG/1
10 TABLET ORAL NIGHTLY
Qty: 30 TABLET | Refills: 5 | Status: SHIPPED | OUTPATIENT
Start: 2025-06-14

## 2025-06-14 RX ORDER — DEXTROMETHORPHAN HYDROBROMIDE AND PROMETHAZINE HYDROCHLORIDE 15; 6.25 MG/5ML; MG/5ML
5 SYRUP ORAL 4 TIMES DAILY PRN
Qty: 240 ML | Refills: 3 | Status: SHIPPED | OUTPATIENT
Start: 2025-06-14

## 2025-06-14 NOTE — ASSESSMENT & PLAN NOTE
Given duration of worsening symptoms with increased productive cough and sinus congestion with maxillary tenderness we will treat with doxycycline.  Given Phenergan DM for cough and congestion.    Close follow-up if no improvement or worsening and we did discuss a chest x-ray if not resolving.

## 2025-06-14 NOTE — ASSESSMENT & PLAN NOTE
Diabetes is improving with treatment.   Continue current treatment regimen.  Diabetes will be reassessed at next regular follow-up visit

## 2025-06-14 NOTE — ASSESSMENT & PLAN NOTE
Discussed worsening blood pressure elevation but she has not been sleeping well we will plan to recheck this at follow-up.  Continue current regimen at this time

## 2025-06-14 NOTE — PROGRESS NOTES
"Chief Complaint  Congestion, sinus pressure, cough, worsening allergies to the season    Subjective    History of Present Illness:  Jing Bojorquez is a 59 y.o. female who presents today for problems with increased congestion and cough and worsening allergy symptoms.  Started a few weeks ago with mowing her yard and she has continued on Zyrtec and Flonase but her cough has become more productive with increased sinus pain and pressure despite over-the-counter treatment.    She does continue on her treatment for hypertension and blood pressure is little higher today but she did not sleep well last night and she would like to hold off on medication changes today.    Doing well with current regimen for diabetes overall but has some social stressors given her family is getting ready to evict her from her father's house because they are trying to sell it after his passing.        Objective   Vital Signs:   /82 (BP Location: Left arm, Patient Position: Sitting, Cuff Size: Adult)   Pulse 81   Ht 163.8 cm (64.5\")   Wt 65.4 kg (144 lb 1.6 oz)   SpO2 98%   BMI 24.35 kg/m²     Review of Systems   Constitutional:  Negative for appetite change, chills and fever.   HENT:  Positive for congestion, postnasal drip and rhinorrhea. Negative for hearing loss.    Eyes:  Negative for blurred vision.   Respiratory:  Positive for cough. Negative for chest tightness and wheezing.    Cardiovascular:  Negative for chest pain.   Gastrointestinal:  Negative for abdominal pain.   Musculoskeletal:  Negative for gait problem.   Skin:  Negative for rash.   Psychiatric/Behavioral:  Negative for depressed mood.        Past History:  Medical History: has a past medical history of Allergic, Diabetes mellitus, and Hyperlipidemia.   Surgical History: has a past surgical history that includes Appendectomy and Cholecystectomy.   Family History: family history includes Cancer in her mother; Diabetes in her father and mother; Hypertension in her " father and mother.   Social History: reports that she has never smoked. She has been exposed to tobacco smoke. She has never used smokeless tobacco. She reports that she does not drink alcohol and does not use drugs.      Current Outpatient Medications:     amitriptyline (ELAVIL) 25 MG tablet, Take 1 tablet by mouth every night at bedtime., Disp: 90 tablet, Rfl: 1    aspirin 81 MG EC tablet, Take 1 tablet by mouth Daily., Disp: 90 tablet, Rfl: 1    atorvastatin (LIPITOR) 40 MG tablet, Take 1 tablet by mouth Daily. For heart protection and stroke prevention, Disp: 90 tablet, Rfl: 1    benazepril (LOTENSIN) 20 MG tablet, Take 1 tablet by mouth Daily., Disp: 90 tablet, Rfl: 1    Blood Glucose Monitoring Suppl (Blood Glucose Monitor System) w/Device kit, Check BG every morning before breakfast/eating  and as needed, Disp: 1 each, Rfl: 0    cetirizine (zyrTEC) 10 MG tablet, Take 1 tablet by mouth Daily., Disp: 90 tablet, Rfl: 1    dapagliflozin Propanediol (Farxiga) 10 MG tablet, Take 10 mg by mouth Daily., Disp: 90 tablet, Rfl: 1    fluticasone (FLONASE) 50 MCG/ACT nasal spray, Administer 2 sprays into the nostril(s) as directed by provider Daily., Disp: 48 g, Rfl: 2    glipizide (Glucotrol) 10 MG tablet, Take 2 tablets by mouth 2 (Two) Times a Day Before Meals., Disp: 360 tablet, Rfl: 1    metFORMIN ER (GLUCOPHAGE-XR) 500 MG 24 hr tablet, Take 2 tablets by mouth Daily With Breakfast & Dinner., Disp: 360 tablet, Rfl: 1    mometasone (ELOCON) 0.1 % cream, Apply 1 Application topically to the appropriate area as directed Daily As Needed (rash/eczema)., Disp: 45 g, Rfl: 5    omeprazole (priLOSEC) 40 MG capsule, Take 1 capsule by mouth 2 (Two) Times a Day., Disp: 180 capsule, Rfl: 1    ondansetron (Zofran) 4 MG tablet, Take 1 tablet by mouth Every 8 (Eight) Hours As Needed for Nausea or Vomiting., Disp: 30 tablet, Rfl: 3    pioglitazone (Actos) 30 MG tablet, Take 1 tablet by mouth Daily. For diabetes (NOTE DOSE INCREASE),  Disp: 90 tablet, Rfl: 1    pregabalin (LYRICA) 150 MG capsule, Take 1 capsule by mouth 3 (Three) Times a Day., Disp: , Rfl:     Qutenza, 4 Patch, 8 % kit topical system, Apply 8 patches topically to the appropriate area as directed Every 3 (Three) Months., Disp: , Rfl:     doxycycline (VIBRAMYCIN) 100 MG capsule, Take 1 capsule by mouth 2 (Two) Times a Day for 10 days. (Take 2 hrs after eating with full glass of water to prevent GI upset), Disp: 20 capsule, Rfl: 0    montelukast (Singulair) 10 MG tablet, Take 1 tablet by mouth Every Night. For allergies, Disp: 30 tablet, Rfl: 5    promethazine-dextromethorphan (PROMETHAZINE-DM) 6.25-15 MG/5ML syrup, Take 5 mL by mouth 4 (Four) Times a Day As Needed for Cough., Disp: 240 mL, Rfl: 3    Allergies: Cefaclor, Penicillins, Azithromycin, Morphine, Ciprofloxacin, and Metformin    Physical Exam  HENT:      Head: Normocephalic.      Right Ear: Tympanic membrane, ear canal and external ear normal.      Left Ear: Tympanic membrane, ear canal and external ear normal.      Nose: Congestion present.   Eyes:      Pupils: Pupils are equal, round, and reactive to light.   Cardiovascular:      Rate and Rhythm: Normal rate and regular rhythm.      Heart sounds: Normal heart sounds. No murmur heard.     No friction rub. No gallop.   Pulmonary:      Effort: Pulmonary effort is normal.      Breath sounds: Rhonchi present. No wheezing.      Comments: Congested cough, scattered rhonchi, no wheezing  Musculoskeletal:         General: Normal range of motion.      Cervical back: Normal range of motion.   Skin:     General: Skin is warm and dry.   Neurological:      General: No focal deficit present.      Mental Status: She is alert.   Psychiatric:         Mood and Affect: Mood normal.         Behavior: Behavior normal.         Thought Content: Thought content normal.          Result Review                   Assessment and Plan  Diagnoses and all orders for this visit:    1. Bronchitis  (Primary)  Assessment & Plan:  Given duration of worsening symptoms with increased productive cough and sinus congestion with maxillary tenderness we will treat with doxycycline.  Given Phenergan DM for cough and congestion.    Close follow-up if no improvement or worsening and we did discuss a chest x-ray if not resolving.    Orders:  -     doxycycline (VIBRAMYCIN) 100 MG capsule; Take 1 capsule by mouth 2 (Two) Times a Day for 10 days. (Take 2 hrs after eating with full glass of water to prevent GI upset)  Dispense: 20 capsule; Refill: 0  -     promethazine-dextromethorphan (PROMETHAZINE-DM) 6.25-15 MG/5ML syrup; Take 5 mL by mouth 4 (Four) Times a Day As Needed for Cough.  Dispense: 240 mL; Refill: 3    2. Acute non-recurrent maxillary sinusitis  Assessment & Plan:  See above    Orders:  -     doxycycline (VIBRAMYCIN) 100 MG capsule; Take 1 capsule by mouth 2 (Two) Times a Day for 10 days. (Take 2 hrs after eating with full glass of water to prevent GI upset)  Dispense: 20 capsule; Refill: 0    3. Seasonal allergic rhinitis due to pollen  Assessment & Plan:  Continue with Zyrtec and Flonase.  Added Singulair for flareups with allergies to spring.    Orders:  -     montelukast (Singulair) 10 MG tablet; Take 1 tablet by mouth Every Night. For allergies  Dispense: 30 tablet; Refill: 5    4. Type 2 diabetes mellitus with microalbuminuria, without long-term current use of insulin  Assessment & Plan:  Diabetes is improving with treatment.   Continue current treatment regimen.  Diabetes will be reassessed at next regular follow-up visit      5. Hypertension, essential  Assessment & Plan:  Discussed worsening blood pressure elevation but she has not been sleeping well we will plan to recheck this at follow-up.  Continue current regimen at this time          BMI is within normal parameters. No other follow-up for BMI required.          Follow Up  Return in about 4 months (around 10/27/2025) for Med recheck.    Norbert  Ward Cardenas MD

## 2025-06-17 DIAGNOSIS — E78.2 HYPERLIPIDEMIA, MIXED: Chronic | ICD-10-CM

## 2025-06-17 DIAGNOSIS — E11.65 TYPE 2 DIABETES MELLITUS WITH HYPERGLYCEMIA, WITHOUT LONG-TERM CURRENT USE OF INSULIN: ICD-10-CM

## 2025-06-17 DIAGNOSIS — E11.29 TYPE 2 DIABETES MELLITUS WITH MICROALBUMINURIA, WITHOUT LONG-TERM CURRENT USE OF INSULIN: Chronic | ICD-10-CM

## 2025-06-17 DIAGNOSIS — R80.9 TYPE 2 DIABETES MELLITUS WITH MICROALBUMINURIA, WITHOUT LONG-TERM CURRENT USE OF INSULIN: Chronic | ICD-10-CM

## 2025-06-17 DIAGNOSIS — E11.40 TYPE 2 DIABETES MELLITUS WITH DIABETIC NEUROPATHY, WITHOUT LONG-TERM CURRENT USE OF INSULIN: ICD-10-CM

## 2025-06-17 RX ORDER — ATORVASTATIN CALCIUM 40 MG/1
40 TABLET, FILM COATED ORAL DAILY
Qty: 90 TABLET | Refills: 1 | OUTPATIENT
Start: 2025-06-17

## 2025-06-17 NOTE — TELEPHONE ENCOUNTER
Caller: Jing Bojorquez    Relationship: Self    Best call back number: [phone]     Requested Prescriptions:   Requested Prescriptions     Pending Prescriptions Disp Refills    atorvastatin (LIPITOR) 40 MG tablet 90 tablet 1     Sig: Take 1 tablet by mouth Daily. For heart protection and stroke prevention        Pharmacy where request should be sent: [facility] [address] [place] [address] [facility] [address] [phone] [facility] [phone] FX     Last office visit with prescribing clinician: 6/14/2025   Last telemedicine visit with prescribing clinician: Visit date not found   Next office visit with prescribing clinician: 10/27/2025     Additional details provided by patient: PATIENT IS COMPLETELY OUT    Does the patient have less than a 3 day supply:  [x] Yes  [] No    Would you like a call back once the refill request has been completed: [x] Yes [] No    If the office needs to give you a call back, can they leave a voicemail: [x] Yes [] No    Radha Cristina Rep   06/17/25 13:45 EDT

## 2025-07-08 DIAGNOSIS — E11.29 TYPE 2 DIABETES MELLITUS WITH MICROALBUMINURIA, WITHOUT LONG-TERM CURRENT USE OF INSULIN: Chronic | ICD-10-CM

## 2025-07-08 DIAGNOSIS — E11.65 TYPE 2 DIABETES MELLITUS WITH HYPERGLYCEMIA, WITHOUT LONG-TERM CURRENT USE OF INSULIN: ICD-10-CM

## 2025-07-08 DIAGNOSIS — R80.9 TYPE 2 DIABETES MELLITUS WITH MICROALBUMINURIA, WITHOUT LONG-TERM CURRENT USE OF INSULIN: Chronic | ICD-10-CM

## 2025-07-08 DIAGNOSIS — E11.40 TYPE 2 DIABETES MELLITUS WITH DIABETIC NEUROPATHY, WITHOUT LONG-TERM CURRENT USE OF INSULIN: ICD-10-CM

## 2025-07-08 DIAGNOSIS — K21.9 GERD WITHOUT ESOPHAGITIS: Chronic | ICD-10-CM

## 2025-07-08 RX ORDER — OMEPRAZOLE 40 MG/1
40 CAPSULE, DELAYED RELEASE ORAL 2 TIMES DAILY
Qty: 180 CAPSULE | Refills: 1 | Status: SHIPPED | OUTPATIENT
Start: 2025-07-08

## 2025-07-08 RX ORDER — METFORMIN HYDROCHLORIDE 500 MG/1
1000 TABLET, EXTENDED RELEASE ORAL
Qty: 360 TABLET | Refills: 1 | Status: SHIPPED | OUTPATIENT
Start: 2025-07-08

## 2025-07-08 NOTE — TELEPHONE ENCOUNTER
Caller: Jing Bojorquez    Relationship: Self    Best call back number: [phone]     Requested Prescriptions:   Requested Prescriptions     Pending Prescriptions Disp Refills    omeprazole (priLOSEC) 40 MG capsule 180 capsule 1     Sig: Take 1 capsule by mouth 2 (Two) Times a Day.    metFORMIN ER (GLUCOPHAGE-XR) 500 MG 24 hr tablet 360 tablet 1     Sig: Take 2 tablets by mouth Daily With Breakfast & Dinner.        Pharmacy where request should be sent: [address] [phone] [facility] [phone]      Last office visit with prescribing clinician: 6/14/2025   Last telemedicine visit with prescribing clinician: Visit date not found   Next office visit with prescribing clinician: 10/27/2025     Additional details provided by patient: PATIENT IS OUT OF METFORMIN    Does the patient have less than a 3 day supply:  [x] Yes  [] No    Would you like a call back once the refill request has been completed: [] Yes [] No    If the office needs to give you a call back, can they leave a voicemail: [] Yes [] No    Radha Genao Rep   07/08/25 08:13 EDT